# Patient Record
Sex: MALE | Race: BLACK OR AFRICAN AMERICAN | NOT HISPANIC OR LATINO | Employment: OTHER | URBAN - METROPOLITAN AREA
[De-identification: names, ages, dates, MRNs, and addresses within clinical notes are randomized per-mention and may not be internally consistent; named-entity substitution may affect disease eponyms.]

---

## 2023-04-27 ENCOUNTER — LAB (OUTPATIENT)
Dept: LAB | Facility: LAB | Age: 38
End: 2023-04-27
Payer: COMMERCIAL

## 2023-04-27 ENCOUNTER — OFFICE VISIT (OUTPATIENT)
Dept: PRIMARY CARE | Facility: CLINIC | Age: 38
End: 2023-04-27
Payer: COMMERCIAL

## 2023-04-27 VITALS
TEMPERATURE: 98.3 F | OXYGEN SATURATION: 98 % | BODY MASS INDEX: 30.09 KG/M2 | WEIGHT: 227 LBS | HEIGHT: 73 IN | DIASTOLIC BLOOD PRESSURE: 77 MMHG | SYSTOLIC BLOOD PRESSURE: 121 MMHG | HEART RATE: 68 BPM

## 2023-04-27 DIAGNOSIS — R20.0 NUMBNESS AND TINGLING: ICD-10-CM

## 2023-04-27 DIAGNOSIS — R20.2 NUMBNESS AND TINGLING: Primary | ICD-10-CM

## 2023-04-27 DIAGNOSIS — R20.0 NUMBNESS AND TINGLING: Primary | ICD-10-CM

## 2023-04-27 DIAGNOSIS — R20.2 NUMBNESS AND TINGLING: ICD-10-CM

## 2023-04-27 DIAGNOSIS — G44.219 EPISODIC TENSION-TYPE HEADACHE, NOT INTRACTABLE: Chronic | ICD-10-CM

## 2023-04-27 LAB
ALBUMIN (G/DL) IN SER/PLAS: 4.4 G/DL (ref 3.4–5)
ANION GAP IN SER/PLAS: 11 MMOL/L (ref 10–20)
CALCIDIOL (25 OH VITAMIN D3) (NG/ML) IN SER/PLAS: 11 NG/ML
CALCIUM (MG/DL) IN SER/PLAS: 9.3 MG/DL (ref 8.6–10.6)
CARBON DIOXIDE, TOTAL (MMOL/L) IN SER/PLAS: 28 MMOL/L (ref 21–32)
CHLORIDE (MMOL/L) IN SER/PLAS: 106 MMOL/L (ref 98–107)
COBALAMIN (VITAMIN B12) (PG/ML) IN SER/PLAS: 897 PG/ML (ref 211–911)
CREATININE (MG/DL) IN SER/PLAS: 0.64 MG/DL (ref 0.5–1.3)
GFR MALE: >90 ML/MIN/1.73M2
GLUCOSE (MG/DL) IN SER/PLAS: 89 MG/DL (ref 74–99)
MAGNESIUM (MG/DL) IN SER/PLAS: 2.4 MG/DL (ref 1.6–2.4)
PHOSPHATE (MG/DL) IN SER/PLAS: 4.6 MG/DL (ref 2.5–4.9)
POTASSIUM (MMOL/L) IN SER/PLAS: 4.4 MMOL/L (ref 3.5–5.3)
SODIUM (MMOL/L) IN SER/PLAS: 141 MMOL/L (ref 136–145)
UREA NITROGEN (MG/DL) IN SER/PLAS: 17 MG/DL (ref 6–23)

## 2023-04-27 PROCEDURE — 80069 RENAL FUNCTION PANEL: CPT

## 2023-04-27 PROCEDURE — 36415 COLL VENOUS BLD VENIPUNCTURE: CPT

## 2023-04-27 PROCEDURE — 82306 VITAMIN D 25 HYDROXY: CPT

## 2023-04-27 PROCEDURE — 99214 OFFICE O/P EST MOD 30 MIN: CPT | Performed by: STUDENT IN AN ORGANIZED HEALTH CARE EDUCATION/TRAINING PROGRAM

## 2023-04-27 PROCEDURE — 82607 VITAMIN B-12: CPT

## 2023-04-27 PROCEDURE — 1036F TOBACCO NON-USER: CPT | Performed by: STUDENT IN AN ORGANIZED HEALTH CARE EDUCATION/TRAINING PROGRAM

## 2023-04-27 PROCEDURE — 83735 ASSAY OF MAGNESIUM: CPT

## 2023-04-27 ASSESSMENT — PAIN SCALES - GENERAL: PAINLEVEL: 0-NO PAIN
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
## 2023-04-29 PROBLEM — R20.0 NUMBNESS AND TINGLING: Status: ACTIVE | Noted: 2023-04-29

## 2023-04-29 PROBLEM — R20.2 NUMBNESS AND TINGLING: Status: ACTIVE | Noted: 2023-04-29

## 2023-04-29 PROBLEM — G44.219 EPISODIC TENSION-TYPE HEADACHE, NOT INTRACTABLE: Chronic | Status: ACTIVE | Noted: 2023-04-29

## 2023-04-29 ASSESSMENT — ENCOUNTER SYMPTOMS
WEAKNESS: 0
DIARRHEA: 0
CHILLS: 0
NAUSEA: 0
DIZZINESS: 0
SORE THROAT: 0
FEVER: 0
SHORTNESS OF BREATH: 0
ARTHRALGIAS: 0
NUMBNESS: 1
ABDOMINAL PAIN: 0
TREMORS: 0
HEADACHES: 1
JOINT SWELLING: 0
COUGH: 0

## 2023-05-13 DIAGNOSIS — E55.9 VITAMIN D INSUFFICIENCY: Primary | ICD-10-CM

## 2023-05-13 RX ORDER — CHOLECALCIFEROL (VITAMIN D3) 25 MCG
25 TABLET ORAL DAILY
Qty: 30 TABLET | Refills: 11 | Status: SHIPPED | OUTPATIENT
Start: 2023-05-13 | End: 2023-05-23 | Stop reason: SDUPTHER

## 2023-05-15 ENCOUNTER — TELEPHONE (OUTPATIENT)
Dept: PRIMARY CARE | Facility: CLINIC | Age: 38
End: 2023-05-15
Payer: COMMERCIAL

## 2023-05-15 DIAGNOSIS — E55.9 VITAMIN D INSUFFICIENCY: ICD-10-CM

## 2023-05-23 RX ORDER — CHOLECALCIFEROL (VITAMIN D3) 25 MCG
25 TABLET ORAL DAILY
Qty: 30 TABLET | Refills: 11 | Status: SHIPPED | OUTPATIENT
Start: 2023-05-23 | End: 2024-05-22

## 2023-07-17 ENCOUNTER — LAB (OUTPATIENT)
Dept: LAB | Facility: LAB | Age: 38
End: 2023-07-17
Payer: COMMERCIAL

## 2023-07-17 ENCOUNTER — OFFICE VISIT (OUTPATIENT)
Dept: PRIMARY CARE | Facility: CLINIC | Age: 38
End: 2023-07-17
Payer: COMMERCIAL

## 2023-07-17 VITALS
HEART RATE: 60 BPM | SYSTOLIC BLOOD PRESSURE: 120 MMHG | TEMPERATURE: 97 F | WEIGHT: 231 LBS | OXYGEN SATURATION: 97 % | HEIGHT: 73 IN | BODY MASS INDEX: 30.62 KG/M2 | DIASTOLIC BLOOD PRESSURE: 75 MMHG

## 2023-07-17 DIAGNOSIS — Z20.2 STD EXPOSURE: ICD-10-CM

## 2023-07-17 DIAGNOSIS — Z20.2 STD EXPOSURE: Primary | ICD-10-CM

## 2023-07-17 LAB
HIV 1/ 2 AG/AB SCREEN: NONREACTIVE
SYPHILIS TOTAL AB: NONREACTIVE

## 2023-07-17 PROCEDURE — 87389 HIV-1 AG W/HIV-1&-2 AB AG IA: CPT

## 2023-07-17 PROCEDURE — 87661 TRICHOMONAS VAGINALIS AMPLIF: CPT

## 2023-07-17 PROCEDURE — 36415 COLL VENOUS BLD VENIPUNCTURE: CPT

## 2023-07-17 PROCEDURE — 87591 N.GONORRHOEAE DNA AMP PROB: CPT

## 2023-07-17 PROCEDURE — 87491 CHLMYD TRACH DNA AMP PROBE: CPT

## 2023-07-17 PROCEDURE — 86780 TREPONEMA PALLIDUM: CPT

## 2023-07-17 PROCEDURE — 1036F TOBACCO NON-USER: CPT

## 2023-07-17 PROCEDURE — 99213 OFFICE O/P EST LOW 20 MIN: CPT

## 2023-07-17 ASSESSMENT — ENCOUNTER SYMPTOMS
DYSURIA: 0
FREQUENCY: 0

## 2023-07-17 ASSESSMENT — PAIN SCALES - GENERAL: PAINLEVEL: 0-NO PAIN

## 2023-07-17 NOTE — PROGRESS NOTES
"Subjective   Patient ID:   Yogi Lynn III is a 38 y.o. male who presents for Exposure to STD.  Exposure to STD  The patient's pertinent negatives include no penile discharge or penile pain. Pertinent negatives include no dysuria or frequency.     #STD exposure  - Patient reported he is currently sexually active  - Reported he has had 2 female partners in the past 6 months  - He uses protection sometimes but not consistently  - Reported that his partner recently tested 2-3 weeks ago positive for chlamydia and trichomonas  - Reported he was recently tested at [place] but never found out his results  - He was given prophylactic Abx. He reported getting 4 pills of one Abx and 2 pills of another Abx  - Denies any associated symptoms     Review of Systems   Genitourinary:  Negative for dysuria, frequency, genital sores, penile discharge and penile pain.       Objective   /75 (BP Location: Right arm, Patient Position: Sitting, BP Cuff Size: Adult)   Pulse 60   Temp 36.1 °C (97 °F) (Temporal)   Ht 1.854 m (6' 1\")   Wt 105 kg (231 lb)   SpO2 97%   BMI 30.48 kg/m²    Physical Exam  Constitutional:       General: He is not in acute distress.     Appearance: Normal appearance.   Eyes:      Extraocular Movements: Extraocular movements intact.   Cardiovascular:      Rate and Rhythm: Normal rate and regular rhythm.      Heart sounds: Normal heart sounds. No murmur heard.  Pulmonary:      Effort: Pulmonary effort is normal. No respiratory distress.      Breath sounds: Normal breath sounds. No wheezing, rhonchi or rales.   Abdominal:      General: There is no distension.      Palpations: Abdomen is soft.      Tenderness: There is no abdominal tenderness.   Neurological:      General: No focal deficit present.      Mental Status: He is alert.   Psychiatric:         Mood and Affect: Mood normal.         Behavior: Behavior normal.         Assessment/Plan     Problem List Items Addressed This Visit    None  Yogi Lynn is " a 38 year old male who presents to the clinic for STI screening after exposure.    #STI screening  - Ordered HIV, Syphilis, Chlamydia, Gonorrhea, & Trichomonas  - Encouraged patient to abstain from [place] and to use protection if he does engage.       RTC in 1 year or earlier if needed.    The patient was discussed with Dr. Marroquin.    Werner Love MD  Family Medicine   PGY-2

## 2023-07-18 LAB
CHLAMYDIA TRACH., AMPLIFIED: NEGATIVE
N. GONORRHEA, AMPLIFIED: NEGATIVE
TRICHOMONAS VAGINALIS: NEGATIVE

## 2023-07-18 NOTE — PROGRESS NOTES
I reviewed the resident's documentation and discussed the patient with the resident. I agree with the resident's medical decision making as documented in the note.     Allie Marroquin MD

## 2023-07-20 ENCOUNTER — TELEPHONE (OUTPATIENT)
Dept: PRIMARY CARE | Facility: CLINIC | Age: 38
End: 2023-07-20
Payer: COMMERCIAL

## 2023-07-20 NOTE — TELEPHONE ENCOUNTER
Result Communication    Resulted Orders   Syphilis Screen with Reflex   Result Value Ref Range    Syphilis Total Ab NONREACTIVE NONREACTIVE      Comment:      No serologic evidence of syphilis infection.  If recent exposure is suspected, repeat syphilis testing  is recommended in 2 to 4 weeks.   HIV 1/2 Antigen/Antibody Screen with Reflex to Confirmation   Result Value Ref Range    HIV 1 and 2 Screen NONREACTIVE NONREACTIVE      Comment:       HIV Ag/Ab screen is performed using the [employer]   HIV Ag/Ab Combo assay which detects the presence of HIV    p24 antigen as well as antibodies to HIV-1   (Group M and O) and HIV-2.  .  No laboratory evidence of HIV infection. If acute HIV infection is   suspected, consider testing for HIV RNA by PCR (viral load).       12:49 PM      Results were successfully communicated with the patient and they acknowledged their understanding.

## 2023-07-31 ENCOUNTER — APPOINTMENT (OUTPATIENT)
Dept: LAB | Facility: LAB | Age: 38
End: 2023-07-31
Payer: COMMERCIAL

## 2023-07-31 LAB
ALANINE AMINOTRANSFERASE (SGPT) (U/L) IN SER/PLAS: 23 U/L (ref 10–52)
ALBUMIN (G/DL) IN SER/PLAS: 4.3 G/DL (ref 3.4–5)
ALKALINE PHOSPHATASE (U/L) IN SER/PLAS: 67 U/L (ref 33–120)
ANION GAP IN SER/PLAS: 10 MMOL/L (ref 10–20)
ASPARTATE AMINOTRANSFERASE (SGOT) (U/L) IN SER/PLAS: 19 U/L (ref 9–39)
BILIRUBIN TOTAL (MG/DL) IN SER/PLAS: 0.6 MG/DL (ref 0–1.2)
CALCIUM (MG/DL) IN SER/PLAS: 9.3 MG/DL (ref 8.6–10.6)
CARBON DIOXIDE, TOTAL (MMOL/L) IN SER/PLAS: 28 MMOL/L (ref 21–32)
CHLORIDE (MMOL/L) IN SER/PLAS: 108 MMOL/L (ref 98–107)
CREATININE (MG/DL) IN SER/PLAS: 0.8 MG/DL (ref 0.5–1.3)
ERYTHROCYTE DISTRIBUTION WIDTH (RATIO) BY AUTOMATED COUNT: 13.6 % (ref 11.5–14.5)
ERYTHROCYTE MEAN CORPUSCULAR HEMOGLOBIN CONCENTRATION (G/DL) BY AUTOMATED: 32.9 G/DL (ref 32–36)
ERYTHROCYTE MEAN CORPUSCULAR VOLUME (FL) BY AUTOMATED COUNT: 86 FL (ref 80–100)
ERYTHROCYTES (10*6/UL) IN BLOOD BY AUTOMATED COUNT: 5.29 X10E12/L (ref 4.5–5.9)
GFR MALE: >90 ML/MIN/1.73M2
GLUCOSE (MG/DL) IN SER/PLAS: 93 MG/DL (ref 74–99)
HEMATOCRIT (%) IN BLOOD BY AUTOMATED COUNT: 45.6 % (ref 41–52)
HEMOGLOBIN (G/DL) IN BLOOD: 15 G/DL (ref 13.5–17.5)
HEPATITIS B VIRUS CORE AB (PRESENCE) IN SER/PLAS BY IMM: NONREACTIVE
HEPATITIS B VIRUS SURFACE AB (MIU/ML) IN SERUM: 63.6 MIU/ML
HEPATITIS B VIRUS SURFACE AG PRESENCE IN SERUM: NONREACTIVE
HEPATITIS C VIRUS AB PRESENCE IN SERUM: NONREACTIVE
LEUKOCYTES (10*3/UL) IN BLOOD BY AUTOMATED COUNT: 4.3 X10E9/L (ref 4.4–11.3)
NRBC (PER 100 WBCS) BY AUTOMATED COUNT: 0 /100 WBC (ref 0–0)
PLATELETS (10*3/UL) IN BLOOD AUTOMATED COUNT: 264 X10E9/L (ref 150–450)
POTASSIUM (MMOL/L) IN SER/PLAS: 4.3 MMOL/L (ref 3.5–5.3)
PROTEIN TOTAL: 6.8 G/DL (ref 6.4–8.2)
SODIUM (MMOL/L) IN SER/PLAS: 142 MMOL/L (ref 136–145)
UREA NITROGEN (MG/DL) IN SER/PLAS: 14 MG/DL (ref 6–23)

## 2023-09-21 PROBLEM — L21.9 SEBORRHEIC DERMATITIS, UNSPECIFIED: Status: ACTIVE | Noted: 2023-01-05

## 2023-09-21 PROBLEM — B35.1 ONYCHOMYCOSIS OF TOENAIL: Status: ACTIVE | Noted: 2023-09-21

## 2023-09-21 PROBLEM — M21.6X1 PRONATION OF BOTH FEET: Status: ACTIVE | Noted: 2023-09-21

## 2023-09-21 PROBLEM — R06.02 SOB (SHORTNESS OF BREATH): Status: ACTIVE | Noted: 2017-01-11

## 2023-09-21 PROBLEM — N52.9 ERECTILE DYSFUNCTION: Status: ACTIVE | Noted: 2023-09-21

## 2023-09-21 PROBLEM — H40.1134 PRIMARY OPEN ANGLE GLAUCOMA OF BOTH EYES, INDETERMINATE STAGE: Status: ACTIVE | Noted: 2023-09-21

## 2023-09-21 PROBLEM — E66.9 OBESITY (BMI 30.0-34.9): Status: ACTIVE | Noted: 2023-09-21

## 2023-09-21 PROBLEM — E78.1 HYPERTRIGLYCERIDEMIA: Status: ACTIVE | Noted: 2023-09-21

## 2023-09-21 PROBLEM — H20.9 ANTERIOR UVEITIS: Status: ACTIVE | Noted: 2023-09-21

## 2023-09-21 PROBLEM — F12.90 MARIJUANA SMOKER: Status: ACTIVE | Noted: 2023-09-21

## 2023-09-21 PROBLEM — A59.9 TRICHOMONAS INFECTION: Status: ACTIVE | Noted: 2017-01-23

## 2023-09-21 PROBLEM — L73.2 HIDRADENITIS SUPPURATIVA: Status: ACTIVE | Noted: 2023-09-21

## 2023-09-21 PROBLEM — H53.8 BLURRY VISION, LEFT EYE: Status: ACTIVE | Noted: 2023-09-21

## 2023-09-21 PROBLEM — M79.673 FOOT PAIN: Status: ACTIVE | Noted: 2023-09-21

## 2023-09-21 PROBLEM — M21.6X2 PRONATION OF BOTH FEET: Status: ACTIVE | Noted: 2023-09-21

## 2023-09-21 PROBLEM — L01.00 IMPETIGO, UNSPECIFIED: Status: ACTIVE | Noted: 2023-01-05

## 2023-09-21 PROBLEM — E78.6 LOW HDL (UNDER 40): Status: ACTIVE | Noted: 2023-09-21

## 2023-09-21 PROBLEM — E66.811 OBESITY (BMI 30.0-34.9): Status: ACTIVE | Noted: 2023-09-21

## 2023-09-21 PROBLEM — H61.21 IMPACTED CERUMEN OF RIGHT EAR: Status: ACTIVE | Noted: 2023-09-21

## 2023-09-21 PROBLEM — L23.9 ALLERGIC CONTACT DERMATITIS, UNSPECIFIED CAUSE: Status: ACTIVE | Noted: 2023-01-05

## 2023-09-21 RX ORDER — CHLORHEXIDINE GLUCONATE 40 MG/ML
1 SOLUTION TOPICAL
COMMUNITY
Start: 2022-05-11 | End: 2023-10-23 | Stop reason: ALTCHOICE

## 2023-09-21 RX ORDER — AZITHROMYCIN 500 MG/1
TABLET, FILM COATED ORAL
COMMUNITY
End: 2023-10-23 | Stop reason: ALTCHOICE

## 2023-09-21 RX ORDER — SULFAMETHOXAZOLE AND TRIMETHOPRIM 400; 80 MG/1; MG/1
2 TABLET ORAL 2 TIMES DAILY
COMMUNITY

## 2023-09-21 RX ORDER — METRONIDAZOLE 500 MG/1
TABLET ORAL
COMMUNITY
End: 2023-10-23 | Stop reason: ALTCHOICE

## 2023-09-21 RX ORDER — CLINDAMYCIN PHOSPHATE 10 UG/ML
LOTION TOPICAL 2 TIMES DAILY
COMMUNITY

## 2023-09-21 RX ORDER — TADALAFIL 5 MG/1
5 TABLET ORAL DAILY
COMMUNITY

## 2023-09-21 RX ORDER — DORZOLAMIDE HYDROCHLORIDE AND TIMOLOL MALEATE 20; 5 MG/ML; MG/ML
1 SOLUTION/ DROPS OPHTHALMIC 2 TIMES DAILY
COMMUNITY
End: 2023-12-06 | Stop reason: SDUPTHER

## 2023-09-21 RX ORDER — CEPHALEXIN 500 MG/1
CAPSULE ORAL
COMMUNITY
End: 2023-10-23 | Stop reason: ALTCHOICE

## 2023-09-21 RX ORDER — LATANOPROSTENE BUNOD 0.24 MG/ML
1 SOLUTION/ DROPS OPHTHALMIC NIGHTLY
COMMUNITY
Start: 2023-07-13 | End: 2023-12-06 | Stop reason: SDUPTHER

## 2023-09-21 RX ORDER — TACROLIMUS 1 MG/G
1 OINTMENT TOPICAL
COMMUNITY
Start: 2022-09-21

## 2023-09-21 RX ORDER — KETOCONAZOLE 20 MG/ML
SHAMPOO, SUSPENSION TOPICAL
COMMUNITY

## 2023-09-21 RX ORDER — FOLIC ACID 1 MG/1
1 TABLET ORAL DAILY
COMMUNITY
End: 2023-10-23 | Stop reason: SDUPTHER

## 2023-09-21 RX ORDER — BRIMONIDINE TARTRATE 1 MG/ML
1 SOLUTION/ DROPS OPHTHALMIC 2 TIMES DAILY
COMMUNITY
End: 2023-12-06 | Stop reason: SDUPTHER

## 2023-09-21 RX ORDER — METHOTREXATE 2.5 MG/1
4 TABLET ORAL
COMMUNITY
End: 2023-10-23 | Stop reason: SDUPTHER

## 2023-09-21 RX ORDER — LATANOPROST 50 UG/ML
1 SOLUTION/ DROPS OPHTHALMIC NIGHTLY
COMMUNITY
End: 2024-03-27 | Stop reason: WASHOUT

## 2023-09-21 RX ORDER — MUPIROCIN 20 MG/G
1 OINTMENT TOPICAL
COMMUNITY
Start: 2022-09-15

## 2023-10-04 ENCOUNTER — PREP FOR PROCEDURE (OUTPATIENT)
Dept: OPHTHALMOLOGY | Facility: CLINIC | Age: 38
End: 2023-10-04
Payer: COMMERCIAL

## 2023-10-04 DIAGNOSIS — H40.42X3 UVEITIC GLAUCOMA, LEFT, SEVERE STAGE: Primary | ICD-10-CM

## 2023-10-04 DIAGNOSIS — H20.9 UVEITIC GLAUCOMA, LEFT, SEVERE STAGE: Primary | ICD-10-CM

## 2023-10-04 RX ORDER — CYCLOPENTOLATE HYDROCHLORIDE 10 MG/ML
1 SOLUTION/ DROPS OPHTHALMIC
Status: CANCELLED | OUTPATIENT
Start: 2023-10-04 | End: 2023-10-04

## 2023-10-04 RX ORDER — MOXIFLOXACIN 5 MG/ML
1 SOLUTION/ DROPS OPHTHALMIC
Status: CANCELLED | OUTPATIENT
Start: 2023-10-04 | End: 2023-10-04

## 2023-10-04 RX ORDER — TROPICAMIDE 10 MG/ML
1 SOLUTION/ DROPS OPHTHALMIC
Status: CANCELLED | OUTPATIENT
Start: 2023-10-04 | End: 2023-10-04

## 2023-10-04 RX ORDER — DICLOFENAC SODIUM 1 MG/ML
1 SOLUTION/ DROPS OPHTHALMIC
Status: CANCELLED | OUTPATIENT
Start: 2023-10-04 | End: 2023-10-04

## 2023-10-04 RX ORDER — PHENYLEPHRINE HYDROCHLORIDE 25 MG/ML
1 SOLUTION/ DROPS OPHTHALMIC
Status: CANCELLED | OUTPATIENT
Start: 2023-10-04 | End: 2023-10-04

## 2023-10-04 NOTE — H&P
History Of Present Illness  Yogi Lynn III is a 38 y.o. male presenting for ce/iol/gatt/tube needling, and STK.     Past Medical History  He has a past medical history of Body mass index (BMI)30.0-30.9, adult (06/13/2022), Hidradenitis suppurativa (05/20/2022), and Personal history of diseases of the skin and subcutaneous tissue (09/10/2021).    Surgical History  He has no past surgical history on file.     Social History  He reports that he has never smoked. He has never used smokeless tobacco. He reports current alcohol use. He reports current drug use. Drug: Marijuana.     Allergies  Patient has no known allergies.    ROS  Patient denies ocular pain, redness, discharge, decreased vision, double vision, blind spots, flashes, or floaters.     Physical Exam  Not recorded          Assessment/Plan   Diagnoses and all orders for this visit:  Uveitic glaucoma, left, severe stage  -     Case Request Operating Room: Extraction Cataract with Insertion Intraocular Lens, Goniotomy, Revision Shunt Eye, Injection Therapeutic Agent Eye Sub-Tenon Space; Standing  Other orders  -     Place in outpatient/hospital ambulatory surgery; Standing  -     Full code; Standing  -     Discharge instructions; Standing  -     Insert peripheral IV; Standing  -     Saline lock IV; Standing  -     Weigh patient; Standing  -     Measure height; Standing  -     Vital Signs; Standing  -     Pulse Oximetry; Standing  -     moxifloxacin (Vigamox) 0.5 % ophthalmic solution 1 drop  -     cyclopentolate (Cyclogyl) 1 % ophthalmic solution 1 drop  -     phenylephrine (Mydfrin) 2.5 % ophthalmic solution 1 drop  -     tropicamide (Mydriacyl) 1 % ophthalmic solution 1 drop  -     diclofenac (Voltaren) 0.1 % ophthalmic solution 1 drop      Proceed with surgery as planned             Janny Barton MD

## 2023-10-23 ENCOUNTER — OFFICE VISIT (OUTPATIENT)
Dept: RHEUMATOLOGY | Facility: CLINIC | Age: 38
End: 2023-10-23
Payer: COMMERCIAL

## 2023-10-23 ENCOUNTER — LAB (OUTPATIENT)
Dept: LAB | Facility: LAB | Age: 38
End: 2023-10-23
Payer: COMMERCIAL

## 2023-10-23 VITALS
SYSTOLIC BLOOD PRESSURE: 114 MMHG | BODY MASS INDEX: 29.95 KG/M2 | HEIGHT: 73 IN | HEART RATE: 65 BPM | TEMPERATURE: 98.4 F | WEIGHT: 226 LBS | DIASTOLIC BLOOD PRESSURE: 74 MMHG

## 2023-10-23 DIAGNOSIS — H20.9 UVEITIS: ICD-10-CM

## 2023-10-23 DIAGNOSIS — H20.9 UVEITIS: Primary | ICD-10-CM

## 2023-10-23 LAB
ALBUMIN SERPL BCP-MCNC: 4.4 G/DL (ref 3.4–5)
ALP SERPL-CCNC: 85 U/L (ref 33–120)
ALT SERPL W P-5'-P-CCNC: 18 U/L (ref 10–52)
ANION GAP SERPL CALC-SCNC: 11 MMOL/L (ref 10–20)
AST SERPL W P-5'-P-CCNC: 14 U/L (ref 9–39)
BILIRUB SERPL-MCNC: 0.9 MG/DL (ref 0–1.2)
BUN SERPL-MCNC: 16 MG/DL (ref 6–23)
CALCIUM SERPL-MCNC: 9.3 MG/DL (ref 8.6–10.6)
CHLORIDE SERPL-SCNC: 106 MMOL/L (ref 98–107)
CO2 SERPL-SCNC: 28 MMOL/L (ref 21–32)
CREAT SERPL-MCNC: 0.68 MG/DL (ref 0.5–1.3)
ERYTHROCYTE [DISTWIDTH] IN BLOOD BY AUTOMATED COUNT: 13.2 % (ref 11.5–14.5)
GFR SERPL CREATININE-BSD FRML MDRD: >90 ML/MIN/1.73M*2
GLUCOSE SERPL-MCNC: 99 MG/DL (ref 74–99)
HCT VFR BLD AUTO: 43.6 % (ref 41–52)
HGB BLD-MCNC: 14.6 G/DL (ref 13.5–17.5)
MCH RBC QN AUTO: 28.8 PG (ref 26–34)
MCHC RBC AUTO-ENTMCNC: 33.5 G/DL (ref 32–36)
MCV RBC AUTO: 86 FL (ref 80–100)
NRBC BLD-RTO: 0 /100 WBCS (ref 0–0)
PLATELET # BLD AUTO: 280 X10*3/UL (ref 150–450)
PMV BLD AUTO: 10.4 FL (ref 7.5–11.5)
POTASSIUM SERPL-SCNC: 4.1 MMOL/L (ref 3.5–5.3)
PROT SERPL-MCNC: 6.5 G/DL (ref 6.4–8.2)
RBC # BLD AUTO: 5.07 X10*6/UL (ref 4.5–5.9)
SODIUM SERPL-SCNC: 141 MMOL/L (ref 136–145)
WBC # BLD AUTO: 5.4 X10*3/UL (ref 4.4–11.3)

## 2023-10-23 PROCEDURE — 1036F TOBACCO NON-USER: CPT | Performed by: STUDENT IN AN ORGANIZED HEALTH CARE EDUCATION/TRAINING PROGRAM

## 2023-10-23 PROCEDURE — 36415 COLL VENOUS BLD VENIPUNCTURE: CPT

## 2023-10-23 PROCEDURE — 99213 OFFICE O/P EST LOW 20 MIN: CPT | Performed by: STUDENT IN AN ORGANIZED HEALTH CARE EDUCATION/TRAINING PROGRAM

## 2023-10-23 PROCEDURE — 80053 COMPREHEN METABOLIC PANEL: CPT

## 2023-10-23 PROCEDURE — 85027 COMPLETE CBC AUTOMATED: CPT

## 2023-10-23 RX ORDER — FOLIC ACID 1 MG/1
1 TABLET ORAL DAILY
Qty: 90 TABLET | Refills: 1 | Status: SHIPPED | OUTPATIENT
Start: 2023-10-23 | End: 2024-05-06

## 2023-10-23 RX ORDER — METHOTREXATE 2.5 MG/1
15 TABLET ORAL
Qty: 60 TABLET | Refills: 1 | Status: SHIPPED | OUTPATIENT
Start: 2023-10-23 | End: 2024-02-12 | Stop reason: SDUPTHER

## 2023-10-23 ASSESSMENT — PAIN SCALES - GENERAL: PAINLEVEL: 0-NO PAIN

## 2023-10-23 NOTE — PROGRESS NOTES
Subjective   Patient ID: [id number]   Yogi Lynn III is a 38 y.o. male who presents for Follow-up (Follow up visit ).  HPI  39 yo with hx of hydradenitis suppurativa, well controlled off treatment, who was refered to us by opthalmology for bilateral anterior uveitis and mixed glaucoma(open glaucoma& inflammatory) for steroid sparing immunosuppressive regimen, is here for follow up.       Patient developed vision loss L>R more than a year ago, and has been receiving ocular steroid drops. (at first was getting intraocular steroid injections).   Infectious workup was negative. RENETTA, HLA b27 negative. ACE neg, CXR WNL.    His uveitis has been quiescent, we started the patient on Methotrexate 6 tabs with folic acid, however he was only taking 4 tabs.    No ocular sxs. Reports inflammation in his eyes is gone. Reports pressure still elevated in the left eye.  No SOB, no rashes, no diarrhea, no infections.  Declines vaccination.  MS minutes. No swelling in joints. Intermittent pain in finger/toes.        Patient has no family or personal hx of rheumatic disease. Smokes marrijuana, occasional alcohol, no drug use.  Denies persistent arthralgias, joint swelling, headaches, nasal, oral ulcers, worsening SOB, CP, fever, chills, raynayd, dry eyes or mouth, constant back pain, skin rash other than the HS hx.  Has no complaints other than the blurry vision in the left eye, no pain.       Review of Systems    Objective   Physical Exam    AO*4  mild ocular erythema   no oral lesions  clear lungs  NlS1S2  no skin rashes ( axillary area not examined)  no active synovitis in joints     Assessment/Plan        39 yo with hx of hydradenitis suppurativa, well controlled off treatment, who was refereed to us by opthalmology for non infectious bilateral anterior uveitis and mixed glaucoma(open glaucoma& inflammatory) for steroid sparing immunosuppressive regimen, is here for follow up.     Patient developed vision loss L>R more than a  year ago, and has been receiving ocular steroid drops. (at first was getting intraocular steroid injections).   Infectious workup was negative. RENETTA, HLA b27 negative. ACE neg, CXR WNL.  Opthalmology suspecting Sarcoid vs HLA-B27. No extra ocular manifestations of Rheumatic diseases.   There is an association in the litterature between HS and uveitis, hence that is a possibility.   Given patient developed ocular glaucoma, continuing ocular steroids is not ideal and opthalmology are kindly asking for a steroid sparing regime. We started methotrexate 6 tabs weekly however patient has been taking 4 tabs. His most recent ophthalmology exam is without inflammation.   I reached out to ophthalmology to discuss the need to uptitrate methotrexate given that he's still on steroids drops and their preference is to increase the dose so that they can try to wean him off steroids meena. in the setting of glaucoma.     Plan:  - Increase methotrexate to  6 tabs once weekly with daily folic acid. Risks/benefits discussed with the patient  -patient to continue following with opthalmology for ocular changes   -repeat blood work in 3 months    RTC in 3-4 months    Tammi Fernandes MD  Rheumatology Fellow,PGY-V    Patient seen with Dr. Logan

## 2023-10-26 ENCOUNTER — OFFICE VISIT (OUTPATIENT)
Dept: OPHTHALMOLOGY | Facility: CLINIC | Age: 38
End: 2023-10-26
Payer: COMMERCIAL

## 2023-10-26 DIAGNOSIS — H25.13 AGE-RELATED NUCLEAR CATARACT OF BOTH EYES: Primary | ICD-10-CM

## 2023-10-26 PROCEDURE — 92136 OPHTHALMIC BIOMETRY: CPT | Performed by: OPHTHALMOLOGY

## 2023-10-26 PROCEDURE — 99214 OFFICE O/P EST MOD 30 MIN: CPT | Performed by: OPHTHALMOLOGY

## 2023-10-26 PROCEDURE — 92136 OPHTHALMIC BIOMETRY: CPT | Mod: BILATERAL PROCEDURE | Performed by: OPHTHALMOLOGY

## 2023-10-26 ASSESSMENT — VISUAL ACUITY
OD_SC: 20/60
OS_PH_SC: 20/50
OS_SC: 20/100
OS_SC+: +1
OD_SC+: -1
METHOD: SNELLEN - LINEAR
OD_PH_SC: 20/40
OD_PH_SC+: +1

## 2023-10-26 ASSESSMENT — TONOMETRY
OS_IOP_MMHG: 40
IOP_METHOD: GOLDMANN APPLANATION
OD_IOP_MMHG: 27

## 2023-10-26 ASSESSMENT — ENCOUNTER SYMPTOMS
PSYCHIATRIC NEGATIVE: 0
HEMATOLOGIC/LYMPHATIC NEGATIVE: 0
CONSTITUTIONAL NEGATIVE: 0
RESPIRATORY NEGATIVE: 0
MUSCULOSKELETAL NEGATIVE: 0
NEUROLOGICAL NEGATIVE: 0
EYES NEGATIVE: 0
ENDOCRINE NEGATIVE: 0
ALLERGIC/IMMUNOLOGIC NEGATIVE: 0
GASTROINTESTINAL NEGATIVE: 0
CARDIOVASCULAR NEGATIVE: 0

## 2023-10-26 ASSESSMENT — EXTERNAL EXAM - LEFT EYE: OS_EXAM: NORMAL

## 2023-10-26 ASSESSMENT — SLIT LAMP EXAM - LIDS
COMMENTS: NORMAL
COMMENTS: NORMAL

## 2023-10-26 ASSESSMENT — EXTERNAL EXAM - RIGHT EYE: OD_EXAM: NORMAL

## 2023-10-26 NOTE — PROGRESS NOTES
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 Sanon  s/p [facility] with STK ou  field 5/25/23: WNL OD, severe defect OS now with only central island  Iop has increased further, patient remains non-compliant with the drops  Likely risk of blindness again emphasized  continue cosopt BID OU, ,  continue vyzulta OU  continue alphagan bid OU  Start taper pf to qday x 2 weeks then stop (going up on methotrexate per rheum will try to taper off the steroids)  RECOMMEND PROCEEDING WITH CE/IOL/GATT/TUBE NEEDLING OS, OD will likely need to follow  See Dr. Sanon in the meantime. IF iop improves markedly and inflammation stays under control after stopping PF will consider defer surgery    anterior uveitis, both eyes:  patient does report some arthralgias  asymmptomatic from ocular standpoint  continue f/u with Dr. Sanon,importance of f/u stressed!    cataract, left eye  will help with adressing above  will put patient on PO pred 3 days before surgery  will discuss with Dr. 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

## 2023-11-28 ENCOUNTER — ANESTHESIA EVENT (OUTPATIENT)
Dept: OPERATING ROOM | Facility: CLINIC | Age: 38
End: 2023-11-28
Payer: COMMERCIAL

## 2023-12-04 ENCOUNTER — APPOINTMENT (OUTPATIENT)
Dept: OPHTHALMOLOGY | Facility: CLINIC | Age: 38
End: 2023-12-04
Payer: COMMERCIAL

## 2023-12-04 ENCOUNTER — PHARMACY VISIT (OUTPATIENT)
Dept: PHARMACY | Facility: CLINIC | Age: 38
End: 2023-12-04
Payer: MEDICAID

## 2023-12-04 DIAGNOSIS — Z98.42 CATARACT EXTRACTION STATUS OF LEFT EYE: Primary | ICD-10-CM

## 2023-12-04 PROCEDURE — RXMED WILLOW AMBULATORY MEDICATION CHARGE

## 2023-12-04 RX ORDER — PREDNISOLONE ACETATE 10 MG/ML
1 SUSPENSION/ DROPS OPHTHALMIC 4 TIMES DAILY
Qty: 5 ML | Refills: 0 | Status: SHIPPED | OUTPATIENT
Start: 2023-12-04

## 2023-12-04 RX ORDER — MOXIFLOXACIN 5 MG/ML
1 SOLUTION/ DROPS OPHTHALMIC 3 TIMES DAILY
Qty: 3 ML | Refills: 0 | Status: SHIPPED | OUTPATIENT
Start: 2023-12-04

## 2023-12-04 RX ORDER — PREDNISOLONE ACETATE 10 MG/ML
1 SUSPENSION/ DROPS OPHTHALMIC 4 TIMES DAILY
COMMUNITY
End: 2023-12-04 | Stop reason: SDUPTHER

## 2023-12-04 RX ORDER — MOXIFLOXACIN 5 MG/ML
1 SOLUTION/ DROPS OPHTHALMIC 3 TIMES DAILY
COMMUNITY
End: 2023-12-04 | Stop reason: SDUPTHER

## 2023-12-05 ENCOUNTER — ANESTHESIA (OUTPATIENT)
Dept: OPERATING ROOM | Facility: CLINIC | Age: 38
End: 2023-12-05
Payer: COMMERCIAL

## 2023-12-05 ENCOUNTER — HOSPITAL ENCOUNTER (OUTPATIENT)
Facility: CLINIC | Age: 38
Setting detail: OUTPATIENT SURGERY
Discharge: HOME | End: 2023-12-05
Attending: OPHTHALMOLOGY | Admitting: OPHTHALMOLOGY
Payer: COMMERCIAL

## 2023-12-05 VITALS
HEIGHT: 73 IN | BODY MASS INDEX: 30.45 KG/M2 | OXYGEN SATURATION: 99 % | SYSTOLIC BLOOD PRESSURE: 125 MMHG | DIASTOLIC BLOOD PRESSURE: 72 MMHG | TEMPERATURE: 97.3 F | RESPIRATION RATE: 16 BRPM | WEIGHT: 229.72 LBS | HEART RATE: 68 BPM

## 2023-12-05 DIAGNOSIS — H40.42X3 UVEITIC GLAUCOMA, LEFT, SEVERE STAGE: Primary | ICD-10-CM

## 2023-12-05 DIAGNOSIS — H20.9 UVEITIC GLAUCOMA, LEFT, SEVERE STAGE: Primary | ICD-10-CM

## 2023-12-05 PROBLEM — F41.9 ANXIETY: Status: ACTIVE | Noted: 2023-12-05

## 2023-12-05 PROCEDURE — 2500000004 HC RX 250 GENERAL PHARMACY W/ HCPCS (ALT 636 FOR OP/ED): Mod: SE

## 2023-12-05 PROCEDURE — 2500000001 HC RX 250 WO HCPCS SELF ADMINISTERED DRUGS (ALT 637 FOR MEDICARE OP): Mod: SE | Performed by: OPHTHALMOLOGY

## 2023-12-05 PROCEDURE — 7100000010 HC PHASE TWO TIME - EACH INCREMENTAL 1 MINUTE: Performed by: OPHTHALMOLOGY

## 2023-12-05 PROCEDURE — A65820 PR RELIEVE INNER EYE PRESSURE

## 2023-12-05 PROCEDURE — 3600000003 HC OR TIME - INITIAL BASE CHARGE - PROCEDURE LEVEL THREE: Performed by: OPHTHALMOLOGY

## 2023-12-05 PROCEDURE — 3700000002 HC GENERAL ANESTHESIA TIME - EACH INCREMENTAL 1 MINUTE: Performed by: OPHTHALMOLOGY

## 2023-12-05 PROCEDURE — 65820 GONIOTOMY: CPT | Performed by: OPHTHALMOLOGY

## 2023-12-05 PROCEDURE — 66184 REVISION OF AQUEOUS SHUNT: CPT | Performed by: OPHTHALMOLOGY

## 2023-12-05 PROCEDURE — 7100000002 HC RECOVERY ROOM TIME - EACH INCREMENTAL 1 MINUTE: Performed by: OPHTHALMOLOGY

## 2023-12-05 PROCEDURE — 7100000001 HC RECOVERY ROOM TIME - INITIAL BASE CHARGE: Performed by: OPHTHALMOLOGY

## 2023-12-05 PROCEDURE — 7100000009 HC PHASE TWO TIME - INITIAL BASE CHARGE: Performed by: OPHTHALMOLOGY

## 2023-12-05 PROCEDURE — 2500000005 HC RX 250 GENERAL PHARMACY W/O HCPCS: Mod: SE

## 2023-12-05 PROCEDURE — 66984 XCAPSL CTRC RMVL W/O ECP: CPT | Performed by: OPHTHALMOLOGY

## 2023-12-05 PROCEDURE — C1780 LENS, INTRAOCULAR (NEW TECH): HCPCS | Performed by: OPHTHALMOLOGY

## 2023-12-05 PROCEDURE — 3700000001 HC GENERAL ANESTHESIA TIME - INITIAL BASE CHARGE: Performed by: OPHTHALMOLOGY

## 2023-12-05 PROCEDURE — 2720000007 HC OR 272 NO HCPCS: Performed by: OPHTHALMOLOGY

## 2023-12-05 PROCEDURE — A65820 PR RELIEVE INNER EYE PRESSURE: Performed by: ANESTHESIOLOGY

## 2023-12-05 PROCEDURE — 3600000008 HC OR TIME - EACH INCREMENTAL 1 MINUTE - PROCEDURE LEVEL THREE: Performed by: OPHTHALMOLOGY

## 2023-12-05 PROCEDURE — 2760000001 HC OR 276 NO HCPCS: Performed by: OPHTHALMOLOGY

## 2023-12-05 DEVICE — ACRYSOF(R) IQ ASPHERIC NATURAL IOL, SINGLE-PIECE ACRYLIC FOLDABLE PCL, UV WITH BLUE LIGHTFILTER, 13.0MM LENGTH, 6.0MM ANTERIORASYMMETRIC BICONVEX OPTIC, PLANAR HAPTICS.
Type: IMPLANTABLE DEVICE | Site: EYE | Status: FUNCTIONAL
Brand: ACRYSOF®

## 2023-12-05 DEVICE — CANALOPLASTY MICROCATHETER KITKIT CONTENTS:(1) CANALOPLASTY MICROCATHETER ITRACK 250A(1) OPTHALMIC VISCOINJECTOR
Type: IMPLANTABLE DEVICE | Site: EYE | Status: FUNCTIONAL
Brand: ITRACK 250A

## 2023-12-05 RX ORDER — PREDNISOLONE ACETATE 10 MG/ML
SUSPENSION/ DROPS OPHTHALMIC AS NEEDED
Status: DISCONTINUED | OUTPATIENT
Start: 2023-12-05 | End: 2023-12-05 | Stop reason: HOSPADM

## 2023-12-05 RX ORDER — OXYCODONE AND ACETAMINOPHEN 5; 325 MG/1; MG/1
1 TABLET ORAL EVERY 4 HOURS PRN
Status: DISCONTINUED | OUTPATIENT
Start: 2023-12-05 | End: 2023-12-05 | Stop reason: HOSPADM

## 2023-12-05 RX ORDER — SODIUM CHLORIDE, SODIUM LACTATE, POTASSIUM CHLORIDE, CALCIUM CHLORIDE 600; 310; 30; 20 MG/100ML; MG/100ML; MG/100ML; MG/100ML
100 INJECTION, SOLUTION INTRAVENOUS CONTINUOUS
Status: DISCONTINUED | OUTPATIENT
Start: 2023-12-05 | End: 2023-12-05 | Stop reason: HOSPADM

## 2023-12-05 RX ORDER — TROPICAMIDE 10 MG/ML
1 SOLUTION/ DROPS OPHTHALMIC
Status: COMPLETED | OUTPATIENT
Start: 2023-12-05 | End: 2023-12-05

## 2023-12-05 RX ORDER — MIDAZOLAM HYDROCHLORIDE 1 MG/ML
1 INJECTION INTRAMUSCULAR; INTRAVENOUS ONCE AS NEEDED
Status: DISCONTINUED | OUTPATIENT
Start: 2023-12-05 | End: 2023-12-05 | Stop reason: HOSPADM

## 2023-12-05 RX ORDER — MOXIFLOXACIN 5 MG/ML
1 SOLUTION/ DROPS OPHTHALMIC
Status: COMPLETED | OUTPATIENT
Start: 2023-12-05 | End: 2023-12-05

## 2023-12-05 RX ORDER — PROPOFOL 10 MG/ML
INJECTION, EMULSION INTRAVENOUS AS NEEDED
Status: DISCONTINUED | OUTPATIENT
Start: 2023-12-05 | End: 2023-12-05

## 2023-12-05 RX ORDER — CYCLOPENTOLATE HYDROCHLORIDE 10 MG/ML
1 SOLUTION/ DROPS OPHTHALMIC
Status: COMPLETED | OUTPATIENT
Start: 2023-12-05 | End: 2023-12-05

## 2023-12-05 RX ORDER — DICLOFENAC SODIUM 1 MG/ML
1 SOLUTION/ DROPS OPHTHALMIC
Status: COMPLETED | OUTPATIENT
Start: 2023-12-05 | End: 2023-12-05

## 2023-12-05 RX ORDER — DICLOFENAC SODIUM 1 MG/ML
SOLUTION/ DROPS OPHTHALMIC AS NEEDED
Status: DISCONTINUED | OUTPATIENT
Start: 2023-12-05 | End: 2023-12-05 | Stop reason: HOSPADM

## 2023-12-05 RX ORDER — PHENYLEPHRINE HYDROCHLORIDE 25 MG/ML
1 SOLUTION/ DROPS OPHTHALMIC
Status: COMPLETED | OUTPATIENT
Start: 2023-12-05 | End: 2023-12-05

## 2023-12-05 RX ORDER — POVIDONE-IODINE 5 %
SOLUTION, NON-ORAL OPHTHALMIC (EYE) AS NEEDED
Status: DISCONTINUED | OUTPATIENT
Start: 2023-12-05 | End: 2023-12-05 | Stop reason: HOSPADM

## 2023-12-05 RX ORDER — LIDOCAINE HYDROCHLORIDE 20 MG/ML
INJECTION, SOLUTION INFILTRATION; PERINEURAL AS NEEDED
Status: DISCONTINUED | OUTPATIENT
Start: 2023-12-05 | End: 2023-12-05 | Stop reason: HOSPADM

## 2023-12-05 RX ORDER — LIDOCAINE HYDROCHLORIDE 10 MG/ML
0.1 INJECTION INFILTRATION; PERINEURAL ONCE
Status: DISCONTINUED | OUTPATIENT
Start: 2023-12-05 | End: 2023-12-05 | Stop reason: HOSPADM

## 2023-12-05 RX ORDER — METOCLOPRAMIDE HYDROCHLORIDE 5 MG/ML
10 INJECTION INTRAMUSCULAR; INTRAVENOUS ONCE AS NEEDED
Status: DISCONTINUED | OUTPATIENT
Start: 2023-12-05 | End: 2023-12-05 | Stop reason: HOSPADM

## 2023-12-05 RX ORDER — LIDOCAINE HYDROCHLORIDE 10 MG/ML
INJECTION INFILTRATION; PERINEURAL AS NEEDED
Status: DISCONTINUED | OUTPATIENT
Start: 2023-12-05 | End: 2023-12-05

## 2023-12-05 RX ORDER — FENTANYL CITRATE 50 UG/ML
INJECTION, SOLUTION INTRAMUSCULAR; INTRAVENOUS AS NEEDED
Status: DISCONTINUED | OUTPATIENT
Start: 2023-12-05 | End: 2023-12-05

## 2023-12-05 RX ORDER — ONDANSETRON HYDROCHLORIDE 2 MG/ML
INJECTION, SOLUTION INTRAVENOUS AS NEEDED
Status: DISCONTINUED | OUTPATIENT
Start: 2023-12-05 | End: 2023-12-05

## 2023-12-05 RX ORDER — DEXAMETHASONE SODIUM PHOSPHATE 100 MG/10ML
INJECTION INTRAMUSCULAR; INTRAVENOUS AS NEEDED
Status: DISCONTINUED | OUTPATIENT
Start: 2023-12-05 | End: 2023-12-05

## 2023-12-05 RX ORDER — MOXIFLOXACIN 5 MG/ML
SOLUTION/ DROPS OPHTHALMIC AS NEEDED
Status: DISCONTINUED | OUTPATIENT
Start: 2023-12-05 | End: 2023-12-05 | Stop reason: HOSPADM

## 2023-12-05 RX ADMIN — DICLOFENAC SODIUM 1 DROP: 1 SOLUTION OPHTHALMIC at 11:00

## 2023-12-05 RX ADMIN — CYCLOPENTOLATE HYDROCHLORIDE 1 DROP: 10 SOLUTION/ DROPS OPHTHALMIC at 11:10

## 2023-12-05 RX ADMIN — PROPOFOL 30 MG: 10 INJECTION, EMULSION INTRAVENOUS at 12:42

## 2023-12-05 RX ADMIN — TROPICAMIDE 1 DROP: 10 SOLUTION/ DROPS OPHTHALMIC at 11:00

## 2023-12-05 RX ADMIN — DICLOFENAC SODIUM 1 DROP: 1 SOLUTION OPHTHALMIC at 11:05

## 2023-12-05 RX ADMIN — TROPICAMIDE 1 DROP: 10 SOLUTION/ DROPS OPHTHALMIC at 11:05

## 2023-12-05 RX ADMIN — PHENYLEPHRINE HYDROCHLORIDE 1 DROP: 25 SOLUTION/ DROPS OPHTHALMIC at 11:10

## 2023-12-05 RX ADMIN — TROPICAMIDE 1 DROP: 10 SOLUTION/ DROPS OPHTHALMIC at 11:10

## 2023-12-05 RX ADMIN — PROPOFOL 200 MG: 10 INJECTION, EMULSION INTRAVENOUS at 12:02

## 2023-12-05 RX ADMIN — DEXAMETHASONE SODIUM PHOSPHATE 4 MG: 10 INJECTION INTRAMUSCULAR; INTRAVENOUS at 12:31

## 2023-12-05 RX ADMIN — SODIUM CHLORIDE, SODIUM LACTATE, POTASSIUM CHLORIDE, AND CALCIUM CHLORIDE: .6; .31; .03; .02 INJECTION, SOLUTION INTRAVENOUS at 11:58

## 2023-12-05 RX ADMIN — CYCLOPENTOLATE HYDROCHLORIDE 1 DROP: 10 SOLUTION/ DROPS OPHTHALMIC at 11:05

## 2023-12-05 RX ADMIN — CYCLOPENTOLATE HYDROCHLORIDE 1 DROP: 10 SOLUTION/ DROPS OPHTHALMIC at 11:00

## 2023-12-05 RX ADMIN — MOXIFLOXACIN 1 DROP: 5 SOLUTION/ DROPS OPHTHALMIC at 11:05

## 2023-12-05 RX ADMIN — LIDOCAINE HYDROCHLORIDE 50 MG: 10 INJECTION, SOLUTION INFILTRATION; PERINEURAL at 12:02

## 2023-12-05 RX ADMIN — DICLOFENAC SODIUM 1 DROP: 1 SOLUTION OPHTHALMIC at 11:10

## 2023-12-05 RX ADMIN — PROPOFOL 50 MG: 10 INJECTION, EMULSION INTRAVENOUS at 12:07

## 2023-12-05 RX ADMIN — MOXIFLOXACIN 1 DROP: 5 SOLUTION/ DROPS OPHTHALMIC at 11:10

## 2023-12-05 RX ADMIN — PHENYLEPHRINE HYDROCHLORIDE 1 DROP: 25 SOLUTION/ DROPS OPHTHALMIC at 11:00

## 2023-12-05 RX ADMIN — PHENYLEPHRINE HYDROCHLORIDE 1 DROP: 25 SOLUTION/ DROPS OPHTHALMIC at 11:05

## 2023-12-05 RX ADMIN — FENTANYL CITRATE 50 MCG: 50 INJECTION, SOLUTION INTRAMUSCULAR; INTRAVENOUS at 12:12

## 2023-12-05 RX ADMIN — ONDANSETRON 4 MG: 2 INJECTION INTRAMUSCULAR; INTRAVENOUS at 12:31

## 2023-12-05 RX ADMIN — MOXIFLOXACIN 1 DROP: 5 SOLUTION/ DROPS OPHTHALMIC at 11:00

## 2023-12-05 ASSESSMENT — COLUMBIA-SUICIDE SEVERITY RATING SCALE - C-SSRS
1. IN THE PAST MONTH, HAVE YOU WISHED YOU WERE DEAD OR WISHED YOU COULD GO TO SLEEP AND NOT WAKE UP?: NO
6. HAVE YOU EVER DONE ANYTHING, STARTED TO DO ANYTHING, OR PREPARED TO DO ANYTHING TO END YOUR LIFE?: NO
2. HAVE YOU ACTUALLY HAD ANY THOUGHTS OF KILLING YOURSELF?: NO

## 2023-12-05 ASSESSMENT — PAIN SCALES - GENERAL
PAIN_LEVEL: 0
PAINLEVEL_OUTOF10: 0 - NO PAIN

## 2023-12-05 ASSESSMENT — PAIN - FUNCTIONAL ASSESSMENT: PAIN_FUNCTIONAL_ASSESSMENT: 0-10

## 2023-12-05 NOTE — OP NOTE
Cataract Phacoemulsification w/I0L (L) Operative Note     Date: 2023  OR Location: [facility] OR    Name: Yogi Lynn III, : 1985, Age: 38 y.o., MRN: [id number], Sex: male    Diagnosis  Pre-op Diagnosis     * Uveitic glaucoma, left, severe stage [H40.42X3, H20.9] Post-op Diagnosis     * Uveitic glaucoma, left, severe stage [H40.42X3, H20.9]     Procedures  Extraction Cataract with Insertion Intraocular Lens  09339 - CT XCAPSL CTRC RMVL INSJ IO LENS PROSTH CPLX WO ECP    Goniotomy  77271 - CT GONIOTOMY  , LEFT    Tube revision, left eye        Surgeons   Panel 1:     * Janny Barton - Primary  Panel 2:     * Janny Barton - Primary    Resident/Fellow/Other Assistant:  Rachael    Procedure Summary  Anesthesia: General  ASA: II  Anesthesia Staff:   Anesthesiologist: Carl Ureña MD  C-AA: TUYET Olguin  Estimated Blood Loss: none        Specimen: No specimens collected     Staff:   Circulator: Sera Larose RN; Yolanda Love RN  Scrub Person: Britt Jenkins RN          Findings: as expected    Indications: Yogi Lynn III is an 38 y.o. male who is having surgery for Uveitic glaucoma, left, severe stage [H40.42X3, H20.9]. LEFT    The patient was seen in the preoperative area. The risks, benefits, complications, treatment options, non-operative alternatives, expected recovery and outcomes were discussed with the patient. The possibilities of reaction to medication, pulmonary aspiration, injury to surrounding structures, bleeding, recurrent infection, the need for additional procedures, failure to diagnose a condition, and creating a complication or operation were discussed with the patient. The patient concurred with the proposed plan, giving informed consent.  The site of surgery was properly noted/marked if necessary per policy.     Procedure Details:   The patient was escorted to the operating room where a time out was completed   and general anesthesia was induced.  The patient was  prepped and draped in   the usual sterile fashion for intraocular surgery. A lid speculum was placed   and the operating microscope was positioned. A paracentesis was made to the   left of the planned cataract incision with a 1mm blade.  Shugarcaine followed by Viscoat was used to   replace the aqueous humor. A temporal clear corneal wound was made with a 2.4   mm keratome, extending 2 mm into clear cornea before entering the anterior   chamber. A continuous curvilinear  capsulorhexis of approximately 5 mm in   diameter was performed. Hydrodissection was performed using a canula. The   pre-chopper was used to crack the nucleus into smaller pieces which were then   removed with the assistance of a horizontal chopper and phaco hand piece.   Residual cortex was removed with IA. ProVisc was used to inflate the capsular   bag. The lens implant  simón SN60WF 20.0 diopter intraocular lens. The lens was   inserted into the capsular bag and rotated to the proper position with a   adrianne. The patient was then positioned for direct gonioscopy. A hour 3 clock hour nasal goniotomy was made.   The microcatheter was primed and then thread for 270 degrees and then retracted whilst   injecting viscoelastic approximately 8-10 clock hours per quadrant. the device   was then rethreaded and used to make a 90 degree goniotomy. The device was then removed an dthe patietn was repositioned supine. A 7-0 vicryl traction suture was placed nasally and the eye was infraducted. A 27 janneth needle was used to needle the temporal and suprior aspects of the plate. An anterior chamber canula was then used to flush the tube and a bleb was raised. The traction suture was removed. Residual   viscoelastic was removed from the eye with the irrigation/aspiration   instrument. The wounds were checked and found to be watertight. The anterior   chamber was at physiologic depth and pressure. Viscoat was left in the anteriro chamber to prevent post oeprative  hypotony. The lid speculum was removed and   a patch and shield were taped in place. The patient tolerated the procedure   well, and there were no complications. Complications:  None   Disposition: stable          Attending Attestation: I was present and scrubbed for the entire procedure    Janny Barton  Phone Number: [phone]

## 2023-12-05 NOTE — H&P
"History Of Present Illness  Yogi Lynn III is a 38 y.o. 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     Past Medical History  Past Medical History:   Diagnosis Date    Body mass index (BMI)30.0-30.9, adult 06/13/2022    BMI 30.0-30.9,adult    Hidradenitis suppurativa 05/20/2022    Hidradenitis suppurativa    Personal history of diseases of the skin and subcutaneous tissue 09/10/2021    History of hidradenitis suppurativa       Surgical History  Past Surgical History:   Procedure Laterality Date    IRIDOTOMY / IRIDECTOMY          Social History  He reports that he has never smoked. He has never used smokeless tobacco. He reports current alcohol use. He reports current drug use. Drug: Marijuana.    Family History  Family History   Problem Relation Name Age of Onset    Diabetes Mother          Allergies  Patient has no known allergies.    Review of Systems   All other systems reviewed and are negative.       Physical Exam  Vitals reviewed.   Constitutional:       Appearance: Normal appearance.   HENT:      Head: Normocephalic and atraumatic.      Mouth/Throat:      Pharynx: Oropharynx is clear.   Eyes:      Conjunctiva/sclera: Conjunctivae normal.   Abdominal:      General: Abdomen is flat.   Musculoskeletal:         General: Normal range of motion.      Cervical back: Normal range of motion.   Skin:     General: Skin is warm.   Neurological:      General: No focal deficit present.      Mental Status: He is alert and oriented to person, place, and time.   Psychiatric:         Mood and Affect: Mood normal.         Behavior: Behavior normal.          Last Recorded Vitals  Blood pressure 126/85, pulse 56, temperature 37 °C (98.6 °F), temperature source Temporal, resp. rate 18, height 1.854 m (6' 1\"), weight 104 kg (229 lb 11.5 oz), SpO2 100 %.    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

## 2023-12-05 NOTE — ANESTHESIA PROCEDURE NOTES
Airway  Date/Time: 12/5/2023 12:05 PM  Urgency: elective    Airway not difficult    Staffing  Performed: TUYET   Authorized by: Carl Ureña MD    Performed by: TUYET Olguin  Patient location during procedure: OR    Indications and Patient Condition  Indications for airway management: anesthesia  Spontaneous Ventilation: absent  Sedation level: deep  Preoxygenated: yes  Patient position: sniffing  Mask difficulty assessment: 1 - vent by mask  Planned trial extubation    Final Airway Details  Final airway type: supraglottic airway      Successful airway: unique  Size 4  Cuff Pressure (cm H2O): 20     Number of attempts at approach: 1

## 2023-12-05 NOTE — DISCHARGE INSTRUCTIONS
Dr. Barton's discharge instructions sheet reviewed with patient, he verbalized understanding, questions answered.

## 2023-12-05 NOTE — ANESTHESIA PREPROCEDURE EVALUATION
Patient: Yogi Lynn III    Procedure Information       Date/Time: 12/05/23 1130    Procedures:       Extraction Cataract with Insertion Intraocular Lens (Left)      Goniotomy (Left)      Revision Shunt Eye (Left)      Injection Therapeutic Agent Eye Sub-Tenon Space (Left)    [facility]    Surgeons: Janny Barton MD            Relevant Problems   Anesthesia (within normal limits)      Cardiovascular   (+) Hypertriglyceridemia      Endocrine (within normal limits)      GI (within normal limits)      /Renal (within normal limits)      Neuro/Psych   (+) Anxiety   (+) Episodic tension-type headache, not intractable      Pulmonary (within normal limits)      GI/Hepatic (within normal limits)      Hematology (within normal limits)      Musculoskeletal (within normal limits)      Eyes, Ears, Nose, and Throat   (+) Primary open angle glaucoma of both eyes, indeterminate stage   (+) Uveitic glaucoma, left, severe stage      Infectious Disease   (+) Hidradenitis suppurativa   (+) Impetigo, unspecified   (+) Onychomycosis of toenail   (+) Trichomonas infection       Clinical information reviewed:   Tobacco  Allergies  Meds   Med Hx  Surg Hx   Fam Hx  Soc Hx        NPO Detail:  NPO/Void Status  Date of Last Liquid: 12/04/23  Time of Last Liquid: 2100  Date of Last Solid: 12/04/23  Time of Last Solid: 2100  Last Intake Type: Light meal         Physical Exam    Airway  Mallampati: II  TM distance: >3 FB  Neck ROM: full     Cardiovascular - normal exam     Dental - normal exam     Pulmonary - normal exam     Abdominal - normal exam             Anesthesia Plan    ASA 2     general     intravenous induction   Anesthetic plan and risks discussed with patient.    Plan discussed with CAA.

## 2023-12-05 NOTE — ANESTHESIA POSTPROCEDURE EVALUATION
Patient: Yogi Lynn III    Procedure Summary       Date: 12/05/23 [facility]    Anesthesia Start: 1200 Anesthesia Stop: 1256    Procedures:       Extraction Cataract with Insertion Intraocular Lens (Left)      Goniotomy (Left) Diagnosis:       Uveitic glaucoma, left, severe stage      (Uveitic glaucoma, left, severe stage [H40.42X3, H20.9])    Surgeons: Janny Barton MD Responsible Provider: Carl Ureña MD    Anesthesia Type: general ASA Status: 2            Anesthesia Type: general    Vitals Value Taken Time   /64 12/05/23 1254   Temp 36.3 °C (97.3 °F) 12/05/23 1254   Pulse 67 12/05/23 1254   Resp 16 12/05/23 1254   SpO2 97 % 12/05/23 1254       Anesthesia Post Evaluation    Patient location during evaluation: PACU  Patient participation: complete - patient participated  Level of consciousness: awake  Pain score: 0  Pain management: adequate  Airway patency: patent  Cardiovascular status: acceptable  Respiratory status: acceptable  Hydration status: acceptable  Postoperative Nausea and Vomiting: none  Comments: No PONV        There were no known notable events for this encounter.

## 2023-12-06 ENCOUNTER — OFFICE VISIT (OUTPATIENT)
Dept: OPHTHALMOLOGY | Facility: CLINIC | Age: 38
End: 2023-12-06
Payer: COMMERCIAL

## 2023-12-06 DIAGNOSIS — H40.1134 PRIMARY OPEN ANGLE GLAUCOMA OF BOTH EYES, INDETERMINATE STAGE: Primary | ICD-10-CM

## 2023-12-06 PROCEDURE — 99024 POSTOP FOLLOW-UP VISIT: CPT | Performed by: OPHTHALMOLOGY

## 2023-12-06 RX ORDER — DORZOLAMIDE HYDROCHLORIDE AND TIMOLOL MALEATE 20; 5 MG/ML; MG/ML
1 SOLUTION/ DROPS OPHTHALMIC 2 TIMES DAILY
Qty: 30 ML | Refills: 3 | Status: SHIPPED | OUTPATIENT
Start: 2023-12-06 | End: 2024-06-06 | Stop reason: SDUPTHER

## 2023-12-06 RX ORDER — LATANOPROSTENE BUNOD 0.24 MG/ML
1 SOLUTION/ DROPS OPHTHALMIC NIGHTLY
Qty: 7.5 ML | Refills: 3 | Status: SHIPPED | OUTPATIENT
Start: 2023-12-06 | End: 2024-03-27 | Stop reason: SDUPTHER

## 2023-12-06 RX ORDER — BRIMONIDINE TARTRATE 1.5 MG/ML
1 SOLUTION/ DROPS OPHTHALMIC 2 TIMES DAILY
Qty: 30 ML | Refills: 3 | Status: SHIPPED | OUTPATIENT
Start: 2023-12-06 | End: 2024-06-06 | Stop reason: SDUPTHER

## 2023-12-06 ASSESSMENT — EXTERNAL EXAM - LEFT EYE: OS_EXAM: NORMAL

## 2023-12-06 ASSESSMENT — PACHYMETRY
OD_CT(UM): 588
OS_CT(UM): 597

## 2023-12-06 ASSESSMENT — VISUAL ACUITY
OS_SC: 20/60
METHOD: SNELLEN - LINEAR

## 2023-12-06 ASSESSMENT — TONOMETRY
OS_IOP_MMHG: 24
IOP_METHOD: GOLDMANN APPLANATION

## 2023-12-06 ASSESSMENT — EXTERNAL EXAM - RIGHT EYE: OD_EXAM: NORMAL

## 2023-12-06 ASSESSMENT — SLIT LAMP EXAM - LIDS
COMMENTS: NORMAL
COMMENTS: NORMAL

## 2023-12-06 NOTE — PROGRESS NOTES
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 Sanon  s/p [facility] FP7 with STK ou  field 5/25/23: WNL OD, severe defect OS now with only central island  Iop has increased further, patient remains non-compliant with the drops  Likely risk of blindness again emphasized  continue cosopt BID OU, ,  continue vyzulta OU  continue alphagan bid OU  Start taper pf to qday x 2 weeks then stop (going up on methotrexate per rheum will try to taper off the steroids)  RECOMMEND PROCEEDING WITH CE/IOL/GATT/TUBE NEEDLING OS, OD will likely need to follow  See Dr. Sanon in the meantime. IF iop improves markedly and inflammation stays under control after stopping PF will consider defer surgery    anterior uveitis, both eyes:  patient does report some arthralgias  asymmptomatic from ocular standpoint  continue f/u with Dr. Sanon,importance of f/u stressed!    cataract, left eye  will help with adressing above  will put patient on PO pred 3 days before surgery  will discuss with Dr. 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

## 2023-12-07 ENCOUNTER — APPOINTMENT (OUTPATIENT)
Dept: OPHTHALMOLOGY | Facility: CLINIC | Age: 38
End: 2023-12-07
Payer: COMMERCIAL

## 2023-12-14 ENCOUNTER — APPOINTMENT (OUTPATIENT)
Dept: OPHTHALMOLOGY | Facility: CLINIC | Age: 38
End: 2023-12-14
Payer: COMMERCIAL

## 2023-12-14 ENCOUNTER — OFFICE VISIT (OUTPATIENT)
Dept: OPHTHALMOLOGY | Facility: CLINIC | Age: 38
End: 2023-12-14
Payer: COMMERCIAL

## 2023-12-14 DIAGNOSIS — H40.1134 PRIMARY OPEN ANGLE GLAUCOMA OF BOTH EYES, INDETERMINATE STAGE: Primary | ICD-10-CM

## 2023-12-14 PROCEDURE — 99024 POSTOP FOLLOW-UP VISIT: CPT | Performed by: OPHTHALMOLOGY

## 2023-12-14 ASSESSMENT — EXTERNAL EXAM - RIGHT EYE: OD_EXAM: NORMAL

## 2023-12-14 ASSESSMENT — CONF VISUAL FIELD
OS_SUPERIOR_TEMPORAL_RESTRICTION: 0
OS_INFERIOR_NASAL_RESTRICTION: 0
OS_SUPERIOR_NASAL_RESTRICTION: 0
OD_NORMAL: 1
OS_INFERIOR_TEMPORAL_RESTRICTION: 0
OS_NORMAL: 1
OD_INFERIOR_TEMPORAL_RESTRICTION: 0
OD_SUPERIOR_TEMPORAL_RESTRICTION: 0
OD_INFERIOR_NASAL_RESTRICTION: 0
OD_SUPERIOR_NASAL_RESTRICTION: 0

## 2023-12-14 ASSESSMENT — TONOMETRY
IOP_METHOD: GOLDMANN APPLANATION
OS_IOP_MMHG: 29
OD_IOP_MMHG: 27

## 2023-12-14 ASSESSMENT — ENCOUNTER SYMPTOMS
NEUROLOGICAL NEGATIVE: 0
RESPIRATORY NEGATIVE: 0
EYES NEGATIVE: 0
ALLERGIC/IMMUNOLOGIC NEGATIVE: 0
CONSTITUTIONAL NEGATIVE: 0
HEMATOLOGIC/LYMPHATIC NEGATIVE: 0
MUSCULOSKELETAL NEGATIVE: 0
PSYCHIATRIC NEGATIVE: 0
GASTROINTESTINAL NEGATIVE: 0
CARDIOVASCULAR NEGATIVE: 0
ENDOCRINE NEGATIVE: 0

## 2023-12-14 ASSESSMENT — EXTERNAL EXAM - LEFT EYE: OS_EXAM: NORMAL

## 2023-12-14 ASSESSMENT — PACHYMETRY
OD_CT(UM): 588
OS_CT(UM): 597

## 2023-12-14 ASSESSMENT — VISUAL ACUITY
METHOD: SNELLEN - LINEAR
OS_SC: 20/100

## 2023-12-14 ASSESSMENT — SLIT LAMP EXAM - LIDS
COMMENTS: NORMAL
COMMENTS: NORMAL

## 2023-12-14 NOTE — PROGRESS NOTES
Internal Medicine PGY-1 Resident Progress Note        PCP: Nydia Lyman MD    Date of Admission: 4/1/2019    Chief Complaint: left hip pain    Subjective:     Met pt at bedside. She said last night was terrible. She would fall asleep and wake up in intense pain at the L lateral lumbar area. She reports that she had difficulty sleeping because of the pain. No fever or chills. No chest pain or chest tightness. No diarrhea. No dysuria (no itching or burning). Overnight hypertensive w/ SBP up to 150 but otherwise VW wnl     LE dopplers negative. MRI L-hip: extensive bone marrow edema involves both sacral alae (suspect underlying insufficiency fractures), both pubic bodies, and distal superior pubic rami - consistent with stress response; a nondisplaced fracture of the left pubic body. SW for SNF placement  PT/OT for eval and treat  Lidoderm patch    Hospital Course: 79 yo F w/ PMH multiple R leg surgeries, HTN, GERD presents w few week history of L groin pain radiating to the back L hip reporting worsening w/ movement and wt bearing. No radiation down the leg. No recent trauma. Has been attending PT 3x/weekly. No fevers or chills. No abd pain. No incontinence (urinary or bowel). Recent ortho consult w/ Dr Ruben Myers, Ortho, on 2/28 found mild arthritis on X-ray. Pain refractory to voltaren, medrol, tizanidine, percocet and gabapentin.     In the ED: hypertensive w/ SBP up to 150; VS otherwise wnl; admitted for further workup    Medications:  Reviewed    Infusion Medications    dextrose       Scheduled Medications    lidocaine  1 patch Transdermal Daily    chlorthalidone  25 mg Oral Daily    furosemide  20 mg Oral Daily    gabapentin  300 mg Oral TID    pantoprazole  40 mg Oral QAM AC    losartan  50 mg Oral Daily    sodium chloride flush  10 mL Intravenous 2 times per day    enoxaparin  40 mg Subcutaneous Daily    budesonide  0.5 mg Nebulization BID    And    formoterol  20 mcg Nebulization BID Pow1 ce/iol/abic/partial gatt/ab-interno tube revision, left eye  IOP improved but still high  decrease pf to 3 x daily OS  Moxi qid OS until bottle finishes  Cosopt bid OU  Start brimonidine bid os, continue od  Vyzulta qhs od only  Stop Shield/activity restriction  Rtc sozeri 2-3 weeks for IOP check    mixed mechanism glaucoma, 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 Sanon  s/p [facility] with STK ou  field 5/25/23: WNL OD, severe defect OS now with only central island  Iop has increased further, patient remains non-compliant with the drops  Likely risk of blindness again emphasized  continue cosopt BID OU, ,  continue vyzulta OU  continue alphagan bid OU  Start taper pf to qday x 2 weeks then stop (going up on methotrexate per rheum will try to taper off the steroids)  RECOMMEND PROCEEDING WITH CE/IOL/GATT/TUBE NEEDLING OS, OD will likely need to follow  See Dr. Sanon in the meantime. IF iop improves markedly and inflammation stays under control after stopping PF will consider defer surgery    anterior uveitis, both eyes:  patient does report some arthralgias  asymmptomatic from ocular standpoint  continue f/u with Dr. Sanon,importance of f/u stressed!    cataract, left eye  will help with adressing above  will put patient on PO pred 3 days before surgery  will discuss with Dr. 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 Coexisting stress fracture of the left pubic body      Left adductor muscle complex strain. VL Extremity Venous Left   Final Result          Assessment/Plan:    Ashley Lugo, 78 y.o. female w/ Hx multiple R leg surgeries, HTN, GERD presents w few week history of L groin pain radiating to the back L hip reporting worsening w/ movement and wt bearing. Refractory to PT, voltaren, medrol, tizanidine, percocet and gabapentin. In the ED: hypertensive w/ SBP up to 150; VS otherwise wnl; admitted for further workup    L groin pain  Few wk history of L groin pain radiating to the back L hip reporting worsening w/ movement and wt bearing. Refractory to PT, voltaren, medrol, tizanidine, percocet and gabapentin. - Per Ortho: appears likely 2/2 lumbar radiculopathy; rec MRI lumbar spine  - LE dopplers negative. - MRI L-hip: extensive bone marrow edema involves both sacral alae (suspect underlying insufficiency fractures), both pubic bodies, and distal superior pubic rami - consistent with stress response; a nondisplaced fracture of the left pubic body.   - PRN acetaminophen, ketorolac, percocet, tizanidine, lidoderm patch    HTN  - cont chlorthalidone, telmisartan    GERD  - cont Protonix    Disposition  - SW for SNF placement  - PT/OT for eval and treat    DVT Prophylaxis: lovenox  Diet: DIET LOW SODIUM 2 GM;  Code Status: Full Code    Dispo - GMF    Madelin Orozco MD    I will discuss the patient with the senior resident and MD Madelin Hawkins MD  Internal Medicine Resident, PGY-1  Pager: [phone]    Addendum to Resident H& P/Progress note:  I have personally seen,examined and evaluated the patient.  I have reviewed the current history, physical findings, labs and assessment and plan and agree with note as documented by resident MD ( Zev Balbuena)  Will require PT/OT eval/ treatment and SNF placement on discharge    Roma Ceballos MD, [address]

## 2023-12-19 NOTE — PROGRESS NOTES
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 Barton  - Continue systemic immunosuppression per rheumatology, same dose of MTX    Primary open angle glaucoma of both eyes, indeterminate [id number]  - continue follow up and management per Dr Barton, nice result after surgery    Drops:  - Pred BID OD, OS TID per Dr. Barton  - Continue cosopt bid OU, brimonidine bid OD per Dr Barton     F/u in 2-3 months

## 2023-12-20 ENCOUNTER — OFFICE VISIT (OUTPATIENT)
Dept: OPHTHALMOLOGY | Facility: CLINIC | Age: 38
End: 2023-12-20
Payer: COMMERCIAL

## 2023-12-20 DIAGNOSIS — H20.9 ANTERIOR UVEITIS: Primary | ICD-10-CM

## 2023-12-20 DIAGNOSIS — H44.113 PANUVEITIS, BILATERAL: ICD-10-CM

## 2023-12-20 PROCEDURE — 92134 CPTRZ OPH DX IMG PST SGM RTA: CPT | Mod: BILATERAL PROCEDURE | Performed by: OPHTHALMOLOGY

## 2023-12-20 PROCEDURE — 99213 OFFICE O/P EST LOW 20 MIN: CPT | Performed by: OPHTHALMOLOGY

## 2023-12-20 ASSESSMENT — EXTERNAL EXAM - LEFT EYE: OS_EXAM: NORMAL

## 2023-12-20 ASSESSMENT — EXTERNAL EXAM - RIGHT EYE: OD_EXAM: NORMAL

## 2023-12-20 ASSESSMENT — PACHYMETRY
OS_CT(UM): 597
OD_CT(UM): 588

## 2023-12-20 ASSESSMENT — SLIT LAMP EXAM - LIDS
COMMENTS: NORMAL
COMMENTS: NORMAL

## 2023-12-20 ASSESSMENT — REFRACTION: OD_AXIS: 012

## 2023-12-20 ASSESSMENT — CUP TO DISC RATIO
OD_RATIO: 0.5
OS_RATIO: 0.99

## 2023-12-20 ASSESSMENT — VISUAL ACUITY
OS_SC: 20/200
METHOD: SNELLEN - LINEAR
OD_SC: 20/50

## 2023-12-20 ASSESSMENT — TONOMETRY
IOP_METHOD: GOLDMANN APPLANATION
OS_IOP_MMHG: 13
OD_IOP_MMHG: 12

## 2024-01-04 ENCOUNTER — OFFICE VISIT (OUTPATIENT)
Dept: OPHTHALMOLOGY | Facility: CLINIC | Age: 39
End: 2024-01-04
Payer: COMMERCIAL

## 2024-01-04 ENCOUNTER — APPOINTMENT (OUTPATIENT)
Dept: OPHTHALMOLOGY | Facility: CLINIC | Age: 39
End: 2024-01-04
Payer: COMMERCIAL

## 2024-01-04 DIAGNOSIS — H20.9 ANTERIOR UVEITIS: Primary | ICD-10-CM

## 2024-01-04 PROCEDURE — 99024 POSTOP FOLLOW-UP VISIT: CPT | Performed by: OPHTHALMOLOGY

## 2024-01-04 ASSESSMENT — PACHYMETRY
OD_CT(UM): 588
OS_CT(UM): 597

## 2024-01-04 ASSESSMENT — TONOMETRY
OD_IOP_MMHG: 29
OS_IOP_MMHG: 27
IOP_METHOD: GOLDMANN APPLANATION

## 2024-01-04 ASSESSMENT — ENCOUNTER SYMPTOMS
ALLERGIC/IMMUNOLOGIC NEGATIVE: 0
EYES NEGATIVE: 0
PSYCHIATRIC NEGATIVE: 0
ENDOCRINE NEGATIVE: 0
CARDIOVASCULAR NEGATIVE: 0
HEMATOLOGIC/LYMPHATIC NEGATIVE: 0
CONSTITUTIONAL NEGATIVE: 0
RESPIRATORY NEGATIVE: 0
GASTROINTESTINAL NEGATIVE: 0
NEUROLOGICAL NEGATIVE: 0
MUSCULOSKELETAL NEGATIVE: 0

## 2024-01-04 ASSESSMENT — CONF VISUAL FIELD
OS_SUPERIOR_TEMPORAL_RESTRICTION: 0
OS_NORMAL: 1
OS_INFERIOR_TEMPORAL_RESTRICTION: 0
OS_INFERIOR_NASAL_RESTRICTION: 0
OD_INFERIOR_NASAL_RESTRICTION: 0
OD_NORMAL: 1
OD_INFERIOR_TEMPORAL_RESTRICTION: 0
OD_SUPERIOR_NASAL_RESTRICTION: 0
OD_SUPERIOR_TEMPORAL_RESTRICTION: 0
OS_SUPERIOR_NASAL_RESTRICTION: 0

## 2024-01-04 ASSESSMENT — VISUAL ACUITY
OS_SC: 20/400
OS_PH_SC: 20/200
METHOD: SNELLEN - LINEAR

## 2024-01-04 ASSESSMENT — EXTERNAL EXAM - RIGHT EYE: OD_EXAM: NORMAL

## 2024-01-04 ASSESSMENT — SLIT LAMP EXAM - LIDS
COMMENTS: NORMAL
COMMENTS: NORMAL

## 2024-01-04 ASSESSMENT — EXTERNAL EXAM - LEFT EYE: OS_EXAM: NORMAL

## 2024-01-04 NOTE — PROGRESS NOTES
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 Sanon  s/p [facility] FP7 with STK ou  field 5/25/23: WNL OD, severe defect OS now with only central island  Iop has increased further, patient remains non-compliant with the drops  Likely risk of blindness again emphasized  continue cosopt BID OU, ,  continue vyzulta OU  continue alphagan bid OU  Start taper pf to qday x 2 weeks then stop (going up on methotrexate per rheum will try to taper off the steroids)  RECOMMEND PROCEEDING WITH CE/IOL/GATT/TUBE NEEDLING OS, OD will likely need to follow  See Dr. Sanon in the meantime. IF iop improves markedly and inflammation stays under control after stopping PF will consider defer surgery    anterior uveitis, both eyes:  patient does report some arthralgias  asymmptomatic from ocular standpoint  continue f/u with Dr. Sanon,importance of f/u stressed!    cataract, left eye  will help with adressing above  will put patient on PO pred 3 days before surgery  will discuss with Dr. 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

## 2024-02-12 ENCOUNTER — OFFICE VISIT (OUTPATIENT)
Dept: RHEUMATOLOGY | Facility: CLINIC | Age: 39
End: 2024-02-12
Payer: COMMERCIAL

## 2024-02-12 ENCOUNTER — LAB (OUTPATIENT)
Dept: LAB | Facility: LAB | Age: 39
End: 2024-02-12
Payer: COMMERCIAL

## 2024-02-12 VITALS
SYSTOLIC BLOOD PRESSURE: 101 MMHG | HEIGHT: 73 IN | BODY MASS INDEX: 29.82 KG/M2 | WEIGHT: 225 LBS | DIASTOLIC BLOOD PRESSURE: 67 MMHG | HEART RATE: 57 BPM | TEMPERATURE: 97.8 F

## 2024-02-12 DIAGNOSIS — H20.9 UVEITIS: ICD-10-CM

## 2024-02-12 DIAGNOSIS — H20.9 UVEITIS: Primary | ICD-10-CM

## 2024-02-12 LAB
ALBUMIN SERPL BCP-MCNC: 3.9 G/DL (ref 3.4–5)
ALP SERPL-CCNC: 65 U/L (ref 33–120)
ALT SERPL W P-5'-P-CCNC: 19 U/L (ref 10–52)
ANION GAP SERPL CALC-SCNC: 9 MMOL/L (ref 10–20)
AST SERPL W P-5'-P-CCNC: 15 U/L (ref 9–39)
BILIRUB SERPL-MCNC: 0.6 MG/DL (ref 0–1.2)
BUN SERPL-MCNC: 15 MG/DL (ref 6–23)
CALCIUM SERPL-MCNC: 9.3 MG/DL (ref 8.6–10.6)
CHLORIDE SERPL-SCNC: 106 MMOL/L (ref 98–107)
CO2 SERPL-SCNC: 29 MMOL/L (ref 21–32)
CREAT SERPL-MCNC: 0.71 MG/DL (ref 0.5–1.3)
CRP SERPL-MCNC: <0.1 MG/DL
EGFRCR SERPLBLD CKD-EPI 2021: >90 ML/MIN/1.73M*2
ERYTHROCYTE [DISTWIDTH] IN BLOOD BY AUTOMATED COUNT: 13.3 % (ref 11.5–14.5)
ERYTHROCYTE [SEDIMENTATION RATE] IN BLOOD BY WESTERGREN METHOD: <1 MM/H (ref 0–15)
GLUCOSE SERPL-MCNC: 94 MG/DL (ref 74–99)
HCT VFR BLD AUTO: 41.5 % (ref 41–52)
HGB BLD-MCNC: 14.5 G/DL (ref 13.5–17.5)
MCH RBC QN AUTO: 29.8 PG (ref 26–34)
MCHC RBC AUTO-ENTMCNC: 34.9 G/DL (ref 32–36)
MCV RBC AUTO: 85 FL (ref 80–100)
NRBC BLD-RTO: 0 /100 WBCS (ref 0–0)
PLATELET # BLD AUTO: 232 X10*3/UL (ref 150–450)
POTASSIUM SERPL-SCNC: 4.2 MMOL/L (ref 3.5–5.3)
PROT SERPL-MCNC: 6.1 G/DL (ref 6.4–8.2)
RBC # BLD AUTO: 4.87 X10*6/UL (ref 4.5–5.9)
SODIUM SERPL-SCNC: 140 MMOL/L (ref 136–145)
WBC # BLD AUTO: 4.6 X10*3/UL (ref 4.4–11.3)

## 2024-02-12 PROCEDURE — 85027 COMPLETE CBC AUTOMATED: CPT

## 2024-02-12 PROCEDURE — 99214 OFFICE O/P EST MOD 30 MIN: CPT | Performed by: STUDENT IN AN ORGANIZED HEALTH CARE EDUCATION/TRAINING PROGRAM

## 2024-02-12 PROCEDURE — 85652 RBC SED RATE AUTOMATED: CPT

## 2024-02-12 PROCEDURE — 86140 C-REACTIVE PROTEIN: CPT

## 2024-02-12 PROCEDURE — 36415 COLL VENOUS BLD VENIPUNCTURE: CPT

## 2024-02-12 PROCEDURE — 80053 COMPREHEN METABOLIC PANEL: CPT

## 2024-02-12 PROCEDURE — 1036F TOBACCO NON-USER: CPT | Performed by: STUDENT IN AN ORGANIZED HEALTH CARE EDUCATION/TRAINING PROGRAM

## 2024-02-12 RX ORDER — METHOTREXATE 2.5 MG/1
15 TABLET ORAL
Qty: 60 TABLET | Refills: 2 | Status: SHIPPED | OUTPATIENT
Start: 2024-02-12 | End: 2024-04-08 | Stop reason: SDUPTHER

## 2024-02-12 ASSESSMENT — PAIN SCALES - GENERAL: PAINLEVEL: 0-NO PAIN

## 2024-02-12 NOTE — PROGRESS NOTES
Subjective   Patient ID: [id number]   Yogi Lynn III is a 39 y.o. male who presents for No chief complaint on file..  HPI  40 yo with hx of hydradenitis suppurativa, well controlled off treatment, who was refered to us by opthalmology for bilateral anterior uveitis and mixed glaucoma(open glaucoma& inflammatory) for steroid sparing immunosuppressive regimen, is here for follow up.       Patient developed vision loss L>R more than a year ago, and has been receiving ocular steroid drops. (at first was getting intraocular steroid injections).   Infectious workup was negative. RENETTA, HLA b27 negative. ACE neg, CXR WNL.    His uveitis has been quiescent, we started the patient on Methotrexate 6 tabs with folic acid. Decreased prednisone drops, to once a day.   No ocular sxs. Reports inflammation in his eyes is gone. No SOB, no rashes, no diarrhea, no infections.  Declines vaccination.  MS minutes. No swelling in joints. Intermittent pain in finger/toes.    Patient has no family or personal hx of rheumatic disease. Smokes marrijuana, occasional alcohol, no drug use.  Denies persistent arthralgias, joint swelling, headaches, nasal, oral ulcers, worsening SOB, CP, fever, chills, raynayd, dry eyes or mouth, constant back pain, skin rash other than the HS hx.  Has no complaints other than the blurry vision in the left eye, no pain.             Objective   Physical Exam    AO*4  mild ocular erythema   no oral lesions  clear lungs  NlS1S2  no skin rashes ( axillary area not examined)  no active synovitis in joints     Assessment/Plan        40 yo with hx of hydradenitis suppurativa, well controlled off treatment, who was refereed to us by opthalmology for non infectious bilateral anterior uveitis and mixed glaucoma(open glaucoma& inflammatory) for steroid sparing immunosuppressive regimen, is here for follow up.     Patient developed vision loss L>R more than a year ago, and has been receiving ocular steroid drops. (at first  was getting intraocular steroid injections).   Infectious workup was negative. RENETTA, HLA b27 negative. ACE neg, CXR WNL.  Opthalmology suspecting Sarcoid vs HLA-B27. No extra ocular manifestations of Rheumatic diseases.   There is an association in the litterature between HS and uveitis, hence that is a possibility.   Given patient developed ocular glaucoma, continuing ocular steroids is not ideal and opthalmology are kindly asking for a steroid sparing regime. We started methotrexate 6 tabs weekly. His most recent ophthalmology exam is without inflammation.   Will reach out to ophthalmology to discuss the need to uptitrate methotrexate given that he's still on steroids drops.      Plan:  - Continue methotrexate to  6 tabs once weekly with daily folic acid  -patient to continue following with opthalmology for ocular changes   -repeat blood work    RTC in 3-4 months    Tammi Fernandes MD  Rheumatology Fellow,PGY-V    Patient seen with Dr. Daniels

## 2024-02-15 ENCOUNTER — OFFICE VISIT (OUTPATIENT)
Dept: OPHTHALMOLOGY | Facility: CLINIC | Age: 39
End: 2024-02-15
Payer: COMMERCIAL

## 2024-02-15 DIAGNOSIS — H20.9 ANTERIOR UVEITIS: Primary | ICD-10-CM

## 2024-02-15 DIAGNOSIS — H40.1134 PRIMARY OPEN ANGLE GLAUCOMA OF BOTH EYES, INDETERMINATE STAGE: ICD-10-CM

## 2024-02-15 PROCEDURE — 99024 POSTOP FOLLOW-UP VISIT: CPT | Performed by: OPHTHALMOLOGY

## 2024-02-15 ASSESSMENT — ENCOUNTER SYMPTOMS
ENDOCRINE NEGATIVE: 0
CONSTITUTIONAL NEGATIVE: 0
NEUROLOGICAL NEGATIVE: 0
ALLERGIC/IMMUNOLOGIC NEGATIVE: 0
MUSCULOSKELETAL NEGATIVE: 0
GASTROINTESTINAL NEGATIVE: 0
HEMATOLOGIC/LYMPHATIC NEGATIVE: 0
EYES NEGATIVE: 0
CARDIOVASCULAR NEGATIVE: 0
RESPIRATORY NEGATIVE: 0
PSYCHIATRIC NEGATIVE: 0

## 2024-02-15 ASSESSMENT — TONOMETRY
OS_IOP_MMHG: 26
IOP_METHOD: GOLDMANN APPLANATION
OD_IOP_MMHG: 18

## 2024-02-15 ASSESSMENT — SLIT LAMP EXAM - LIDS
COMMENTS: NORMAL
COMMENTS: NORMAL

## 2024-02-15 ASSESSMENT — VISUAL ACUITY
OD_SC: 20/80
OD_PH_SC+: -2
OD_PH_SC: 20/50
OS_SC: CF 6'
OS_PH_SC: 20/400
METHOD: SNELLEN - LINEAR

## 2024-02-15 ASSESSMENT — PACHYMETRY
OS_CT(UM): 597
OD_CT(UM): 588

## 2024-02-15 ASSESSMENT — EXTERNAL EXAM - LEFT EYE: OS_EXAM: NORMAL

## 2024-02-15 ASSESSMENT — EXTERNAL EXAM - RIGHT EYE: OD_EXAM: NORMAL

## 2024-02-15 NOTE — PROGRESS NOTES
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 Sanon  s/p [facility] FP7 with STK ou  field 5/25/23: WNL OD, severe defect OS now with only central island  Iop has increased further, patient remains non-compliant with the drops  Likely risk of blindness again emphasized  continue cosopt BID OU, ,  continue vyzulta OU  continue alphagan bid OU  Start taper pf to qday x 2 weeks then stop (going up on methotrexate per rheum will try to taper off the steroids)  RECOMMEND PROCEEDING WITH CE/IOL/GATT/TUBE NEEDLING OS, OD will likely need to follow  See Dr. Sanon in the meantime. IF iop improves markedly and inflammation stays under control after stopping PF will consider defer surgery    anterior uveitis, both eyes:  patient does report some arthralgias  asymmptomatic from ocular standpoint  continue f/u with Dr. Sanon,importance of f/u stressed!    cataract, left eye  will help with adressing above  will put patient on PO pred 3 days before surgery  will discuss with Dr. 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

## 2024-03-06 ENCOUNTER — OFFICE VISIT (OUTPATIENT)
Dept: OPHTHALMOLOGY | Facility: CLINIC | Age: 39
End: 2024-03-06
Payer: COMMERCIAL

## 2024-03-06 DIAGNOSIS — H40.42X3 UVEITIC GLAUCOMA, LEFT, SEVERE STAGE: ICD-10-CM

## 2024-03-06 DIAGNOSIS — H20.9 ANTERIOR UVEITIS: Primary | ICD-10-CM

## 2024-03-06 DIAGNOSIS — H40.1134 PRIMARY OPEN ANGLE GLAUCOMA OF BOTH EYES, INDETERMINATE STAGE: ICD-10-CM

## 2024-03-06 DIAGNOSIS — H44.113 PANUVEITIS, BILATERAL: ICD-10-CM

## 2024-03-06 DIAGNOSIS — H20.9 UVEITIC GLAUCOMA, LEFT, SEVERE STAGE: ICD-10-CM

## 2024-03-06 PROCEDURE — 99214 OFFICE O/P EST MOD 30 MIN: CPT | Performed by: OPHTHALMOLOGY

## 2024-03-06 PROCEDURE — 92134 CPTRZ OPH DX IMG PST SGM RTA: CPT | Mod: BILATERAL PROCEDURE | Performed by: OPHTHALMOLOGY

## 2024-03-06 ASSESSMENT — TONOMETRY
IOP_METHOD: TONOPEN
OD_IOP_MMHG: 18
OS_IOP_MMHG: 22

## 2024-03-06 ASSESSMENT — VISUAL ACUITY
OD_CC+: +1
OD_CC: 20/50
OS_CC: 20/200
METHOD: SNELLEN - LINEAR

## 2024-03-06 ASSESSMENT — EXTERNAL EXAM - RIGHT EYE: OD_EXAM: NORMAL

## 2024-03-06 ASSESSMENT — PACHYMETRY
OD_CT(UM): 588
OS_CT(UM): 597

## 2024-03-06 ASSESSMENT — ENCOUNTER SYMPTOMS
MUSCULOSKELETAL NEGATIVE: 0
CONSTITUTIONAL NEGATIVE: 0
ENDOCRINE NEGATIVE: 0
ALLERGIC/IMMUNOLOGIC NEGATIVE: 0
RESPIRATORY NEGATIVE: 0
HEMATOLOGIC/LYMPHATIC NEGATIVE: 0
PSYCHIATRIC NEGATIVE: 0
GASTROINTESTINAL NEGATIVE: 0
EYES NEGATIVE: 0
CARDIOVASCULAR NEGATIVE: 0
NEUROLOGICAL NEGATIVE: 0

## 2024-03-06 ASSESSMENT — CONF VISUAL FIELD
OS_INFERIOR_TEMPORAL_RESTRICTION: 0
OD_INFERIOR_TEMPORAL_RESTRICTION: 0
OS_SUPERIOR_NASAL_RESTRICTION: 0
OD_SUPERIOR_NASAL_RESTRICTION: 0
OS_SUPERIOR_TEMPORAL_RESTRICTION: 0
OD_INFERIOR_NASAL_RESTRICTION: 0
OD_SUPERIOR_TEMPORAL_RESTRICTION: 0
OS_INFERIOR_NASAL_RESTRICTION: 0
OD_NORMAL: 1
OS_NORMAL: 1

## 2024-03-06 ASSESSMENT — CUP TO DISC RATIO
OS_RATIO: 0.99
OD_RATIO: 0.5

## 2024-03-06 ASSESSMENT — SLIT LAMP EXAM - LIDS
COMMENTS: NORMAL
COMMENTS: NORMAL

## 2024-03-06 ASSESSMENT — EXTERNAL EXAM - LEFT EYE: OS_EXAM: NORMAL

## 2024-03-06 NOTE — PROGRESS NOTES
Panuveitis, [id number]  Anterior spmeaqsY11.9  - most likely underlying dx of sarcoid  - OCT today shows significantly worsened edema of the left eye.   - Has history of IOP rise on steroid drops per patient, following with Dr. Barton  - Discussed option of doing STK OS today, however patient declines. Also discussed increasing PF to daily however patient is reluctant to change due to fear of IOP increase. Will discuss management options with Dr. Barton to control both IOP and worsening edema of the left eye.    - Continue systemic immunosuppression per rheumatology, same dose of MTX    Primary open angle glaucoma of both eyes, indeterminate [id number]  - continue follow up and management per Dr Barton, nice result after surgery    Drops:  -Cosopt bid OU, Continue brimonidine bid ou, Vyzulta qhs ou, PF QODay OU      Follow up in 2 weeks.

## 2024-03-20 ENCOUNTER — OFFICE VISIT (OUTPATIENT)
Dept: OPHTHALMOLOGY | Facility: CLINIC | Age: 39
End: 2024-03-20
Payer: COMMERCIAL

## 2024-03-20 DIAGNOSIS — H20.9 ANTERIOR UVEITIS: Primary | ICD-10-CM

## 2024-03-20 PROCEDURE — 99214 OFFICE O/P EST MOD 30 MIN: CPT | Performed by: OPHTHALMOLOGY

## 2024-03-20 PROCEDURE — 67515 INJECT/TREAT EYE SOCKET: CPT | Mod: LEFT SIDE | Performed by: OPHTHALMOLOGY

## 2024-03-20 PROCEDURE — 92134 CPTRZ OPH DX IMG PST SGM RTA: CPT | Mod: BILATERAL PROCEDURE | Performed by: OPHTHALMOLOGY

## 2024-03-20 RX ORDER — TRIAMCINOLONE ACETONIDE 40 MG/ML
2.5 INJECTION, SUSPENSION INTRA-ARTICULAR; INTRAMUSCULAR
Status: COMPLETED | OUTPATIENT
Start: 2024-03-20 | End: 2024-03-20

## 2024-03-20 RX ADMIN — TRIAMCINOLONE ACETONIDE 2.5 MG: 40 INJECTION, SUSPENSION INTRA-ARTICULAR; INTRAMUSCULAR at 11:51

## 2024-03-20 ASSESSMENT — CONF VISUAL FIELD
OS_SUPERIOR_NASAL_RESTRICTION: 0
OS_INFERIOR_TEMPORAL_RESTRICTION: 0
OD_SUPERIOR_TEMPORAL_RESTRICTION: 0
OS_SUPERIOR_TEMPORAL_RESTRICTION: 0
OD_NORMAL: 1
OS_INFERIOR_NASAL_RESTRICTION: 0
OS_NORMAL: 1
OD_INFERIOR_TEMPORAL_RESTRICTION: 0
OD_INFERIOR_NASAL_RESTRICTION: 0
OD_SUPERIOR_NASAL_RESTRICTION: 0

## 2024-03-20 ASSESSMENT — ENCOUNTER SYMPTOMS
RESPIRATORY NEGATIVE: 0
MUSCULOSKELETAL NEGATIVE: 0
PSYCHIATRIC NEGATIVE: 0
CARDIOVASCULAR NEGATIVE: 0
HEMATOLOGIC/LYMPHATIC NEGATIVE: 0
CONSTITUTIONAL NEGATIVE: 0
ALLERGIC/IMMUNOLOGIC NEGATIVE: 0
EYES NEGATIVE: 0
ENDOCRINE NEGATIVE: 0
GASTROINTESTINAL NEGATIVE: 0
NEUROLOGICAL NEGATIVE: 0

## 2024-03-20 ASSESSMENT — VISUAL ACUITY
METHOD: SNELLEN - LINEAR
OD_CC+: +1
OD_CC: 20/50
OS_CC: 20/50

## 2024-03-20 ASSESSMENT — PACHYMETRY
OD_CT(UM): 588
OS_CT(UM): 597

## 2024-03-20 ASSESSMENT — TONOMETRY
OD_IOP_MMHG: 18
IOP_METHOD: TONOPEN
OS_IOP_MMHG: 21

## 2024-03-20 ASSESSMENT — CUP TO DISC RATIO
OS_RATIO: 0.99
OD_RATIO: 0.5

## 2024-03-20 ASSESSMENT — EXTERNAL EXAM - RIGHT EYE: OD_EXAM: NORMAL

## 2024-03-20 ASSESSMENT — SLIT LAMP EXAM - LIDS
COMMENTS: NORMAL
COMMENTS: NORMAL

## 2024-03-20 ASSESSMENT — EXTERNAL EXAM - LEFT EYE: OS_EXAM: NORMAL

## 2024-03-20 NOTE — PROGRESS NOTES
OCT : 03/20/24     OD: Normal Foveal Contour & Retinal laminations, EZ line preserved, (-) IRF/subretinal fluid (SRF)  OS: Large cystoid edema, mild interval improvement  (prior 731)       Panuveitis, [id number]  Anterior bfikvvgW76.9  - most likely underlying dx of sarcoid  - Has history of IOP rise on steroid drops per patient, following with Dr. Barton      - Discussed option of doing STK OS today given recalcitrant CME, discussed risk of IOP elevation and need for closer monitoring   - Pt agrees with STK OS   - Half dose (0.25cc) STK OS performed today   - Continue systemic immunosuppression per rheumatology, same dose of MTX    Primary open angle glaucoma of both eyes, indeterminate [id number]  - continue follow up and management per Dr Barton    Drops:  -Cosopt bid OU, Continue brimonidine bid ou, Vyzulta qhs ou, PF QODay OU    Follow up with retina 2 months  Follow up with Dr. Barton for IOP check next week

## 2024-03-27 ENCOUNTER — OFFICE VISIT (OUTPATIENT)
Dept: OPHTHALMOLOGY | Facility: CLINIC | Age: 39
End: 2024-03-27
Payer: COMMERCIAL

## 2024-03-27 DIAGNOSIS — H44.113 PANUVEITIS, BILATERAL: Primary | ICD-10-CM

## 2024-03-27 DIAGNOSIS — H40.1134 PRIMARY OPEN ANGLE GLAUCOMA OF BOTH EYES, INDETERMINATE STAGE: ICD-10-CM

## 2024-03-27 PROCEDURE — 92134 CPTRZ OPH DX IMG PST SGM RTA: CPT | Mod: BILATERAL PROCEDURE | Performed by: OPHTHALMOLOGY

## 2024-03-27 PROCEDURE — 99214 OFFICE O/P EST MOD 30 MIN: CPT | Performed by: OPHTHALMOLOGY

## 2024-03-27 RX ORDER — ACETAZOLAMIDE 500 MG/1
500 CAPSULE, EXTENDED RELEASE ORAL 2 TIMES DAILY
Qty: 60 CAPSULE | Refills: 0 | Status: SHIPPED | OUTPATIENT
Start: 2024-03-27 | End: 2024-05-09

## 2024-03-27 RX ORDER — LATANOPROSTENE BUNOD 0.24 MG/ML
1 SOLUTION/ DROPS OPHTHALMIC NIGHTLY
Qty: 7.5 ML | Refills: 3 | Status: SHIPPED | OUTPATIENT
Start: 2024-03-27 | End: 2024-06-06 | Stop reason: SDUPTHER

## 2024-03-27 ASSESSMENT — ENCOUNTER SYMPTOMS
CONSTITUTIONAL NEGATIVE: 0
HEMATOLOGIC/LYMPHATIC NEGATIVE: 0
MUSCULOSKELETAL NEGATIVE: 0
ALLERGIC/IMMUNOLOGIC NEGATIVE: 0
RESPIRATORY NEGATIVE: 0
GASTROINTESTINAL NEGATIVE: 0
ENDOCRINE NEGATIVE: 0
PSYCHIATRIC NEGATIVE: 0
EYES NEGATIVE: 0
NEUROLOGICAL NEGATIVE: 0
CARDIOVASCULAR NEGATIVE: 0

## 2024-03-27 ASSESSMENT — TONOMETRY
IOP_METHOD: GOLDMANN APPLANATION
OS_IOP_MMHG: 38
OD_IOP_MMHG: 32

## 2024-03-27 ASSESSMENT — REFRACTION_MANIFEST
OD_CYLINDER: +1.75
OS_CYLINDER: +1.75
OS_SPHERE: -3.25
OD_AXIS: 077
OD_SPHERE: -3.00
OS_AXIS: 108

## 2024-03-27 ASSESSMENT — EXTERNAL EXAM - LEFT EYE: OS_EXAM: NORMAL

## 2024-03-27 ASSESSMENT — CONF VISUAL FIELD
OS_SUPERIOR_TEMPORAL_RESTRICTION: 0
OD_NORMAL: 1
OS_NORMAL: 1
OS_INFERIOR_NASAL_RESTRICTION: 0
OD_INFERIOR_TEMPORAL_RESTRICTION: 0
OD_SUPERIOR_NASAL_RESTRICTION: 0
OD_SUPERIOR_TEMPORAL_RESTRICTION: 0
OS_SUPERIOR_NASAL_RESTRICTION: 0
OS_INFERIOR_TEMPORAL_RESTRICTION: 0
OD_INFERIOR_NASAL_RESTRICTION: 0

## 2024-03-27 ASSESSMENT — EXTERNAL EXAM - RIGHT EYE: OD_EXAM: NORMAL

## 2024-03-27 ASSESSMENT — VISUAL ACUITY
OD_SC: 20/70
OS_SC: 20/400
METHOD: SNELLEN - LINEAR
OD_SC+: +2

## 2024-03-27 ASSESSMENT — PACHYMETRY
OD_CT(UM): 588
OS_CT(UM): 597

## 2024-03-27 ASSESSMENT — SLIT LAMP EXAM - LIDS
COMMENTS: NORMAL
COMMENTS: NORMAL

## 2024-03-27 NOTE — PROGRESS NOTES
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 Sanon  s/p [facility] with STK ou  S/p ce/iol/abic/partial gatt/ab-interno tube revision, left eye  field 5/25/23: WNL OD, severe defect OS now with only central island  Iop has increased further, patient remains non-compliant with the drops  Likely risk of blindness again emphasized  Patient had intractable CME after last surgery, requring STK, IOP now backk up  continue cosopt BID OU, ,  continue vyzulta OU  continue alphagan bid OU  Start diamox 500 mg BID, pt told to stay hydrated, eat a banana a day   Rtc 3 weeks for IOP check    anterior uveitis, both eyes:  patient does report some arthralgias  asymmptomatic from ocular standpoint  continue f/u with Dr. Sanon,importance of f/u stressed!    cataract, left eye  will help with adressing above  will put patient on PO pred 3 days before surgery  will discuss with Dr. 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

## 2024-04-02 DIAGNOSIS — H20.9 UVEITIS: ICD-10-CM

## 2024-04-08 DIAGNOSIS — H20.9 UVEITIS: ICD-10-CM

## 2024-04-08 RX ORDER — METHOTREXATE 2.5 MG/1
TABLET ORAL
Qty: 60 TABLET | Refills: 1 | OUTPATIENT
Start: 2024-04-08

## 2024-04-08 RX ORDER — METHOTREXATE 2.5 MG/1
15 TABLET ORAL
Qty: 60 TABLET | Refills: 2 | Status: SHIPPED | OUTPATIENT
Start: 2024-04-08 | End: 2024-05-13 | Stop reason: SDUPTHER

## 2024-04-08 NOTE — TELEPHONE ENCOUNTER
Patient would like a refill on Methotrexate at [facility] on [address]

## 2024-04-25 ENCOUNTER — APPOINTMENT (OUTPATIENT)
Dept: OPHTHALMOLOGY | Facility: CLINIC | Age: 39
End: 2024-04-25
Payer: COMMERCIAL

## 2024-05-02 ENCOUNTER — OFFICE VISIT (OUTPATIENT)
Dept: OPHTHALMOLOGY | Facility: CLINIC | Age: 39
End: 2024-05-02
Payer: COMMERCIAL

## 2024-05-02 ENCOUNTER — APPOINTMENT (OUTPATIENT)
Dept: OPHTHALMOLOGY | Facility: CLINIC | Age: 39
End: 2024-05-02
Payer: COMMERCIAL

## 2024-05-02 DIAGNOSIS — H40.42X3 UVEITIC GLAUCOMA, LEFT, SEVERE STAGE: Primary | ICD-10-CM

## 2024-05-02 DIAGNOSIS — H42 GLAUCOMA OF RIGHT EYE ASSOCIATED WITH UNDERLYING DISEASE: ICD-10-CM

## 2024-05-02 DIAGNOSIS — H20.9 UVEITIC GLAUCOMA, LEFT, SEVERE STAGE: Primary | ICD-10-CM

## 2024-05-02 PROCEDURE — 99214 OFFICE O/P EST MOD 30 MIN: CPT | Performed by: OPHTHALMOLOGY

## 2024-05-02 ASSESSMENT — VISUAL ACUITY
OS_SC: 20/400
OD_SC+: +2
OS_PH_SC: 20/200
OD_PH_SC: 20/50
OD_SC: 20/80
OD_PH_SC+: +2
METHOD: SNELLEN - LINEAR

## 2024-05-02 ASSESSMENT — CONF VISUAL FIELD
OD_SUPERIOR_TEMPORAL_RESTRICTION: 0
OD_INFERIOR_NASAL_RESTRICTION: 0
OS_INFERIOR_TEMPORAL_RESTRICTION: 0
OS_INFERIOR_NASAL_RESTRICTION: 0
OD_SUPERIOR_NASAL_RESTRICTION: 0
OS_SUPERIOR_TEMPORAL_RESTRICTION: 0
OS_SUPERIOR_NASAL_RESTRICTION: 0
OD_NORMAL: 1
OD_INFERIOR_TEMPORAL_RESTRICTION: 0
OS_NORMAL: 1

## 2024-05-02 ASSESSMENT — SLIT LAMP EXAM - LIDS
COMMENTS: NORMAL
COMMENTS: NORMAL

## 2024-05-02 ASSESSMENT — TONOMETRY
IOP_METHOD: GOLDMANN APPLANATION
OD_IOP_MMHG: 21
OS_IOP_MMHG: 36

## 2024-05-02 ASSESSMENT — ENCOUNTER SYMPTOMS
RESPIRATORY NEGATIVE: 0
ENDOCRINE NEGATIVE: 0
ALLERGIC/IMMUNOLOGIC NEGATIVE: 0
CONSTITUTIONAL NEGATIVE: 0
CARDIOVASCULAR NEGATIVE: 0
GASTROINTESTINAL NEGATIVE: 0
NEUROLOGICAL NEGATIVE: 0
HEMATOLOGIC/LYMPHATIC NEGATIVE: 0
MUSCULOSKELETAL NEGATIVE: 0
PSYCHIATRIC NEGATIVE: 0
EYES NEGATIVE: 0

## 2024-05-02 ASSESSMENT — PACHYMETRY
OD_CT(UM): 588
OS_CT(UM): 597

## 2024-05-02 ASSESSMENT — EXTERNAL EXAM - LEFT EYE: OS_EXAM: NORMAL

## 2024-05-02 ASSESSMENT — EXTERNAL EXAM - RIGHT EYE: OD_EXAM: NORMAL

## 2024-05-02 NOTE — PROGRESS NOTES
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 Sanon  s/p [facility]7 with STK ou  S/p ce/iol/abic/partial gatt/ab-interno tube revision, left eye  field 5/25/23: WNL OD, severe defect OS now with only central island  Iop remains too high OS, patient has not been taking diamox  Likely risk of blindness again emphasized  Long discussion, we have limited options for OS going forward and visual prognosis is grim  Recommend proceeding with clear path as a 2nd tube shunt vs tube exchange, patient unwilling to proceed, states he will take diamox instead  continue cosopt BID OU, ,  continue vyzulta OU  continue alphagan bid OU  Start diamox 500 mg BID, pt told to stay hydrated, eat a banana a day   Rtc 3-4 weeks for IOP check    anterior uveitis, both eyes:  Remains quiet  continue f/u with Dr. Sanon,importance of f/u stressed!    cataract, right eye  monitor

## 2024-05-06 DIAGNOSIS — H20.9 UVEITIS: ICD-10-CM

## 2024-05-06 RX ORDER — FOLIC ACID 1 MG/1
1 TABLET ORAL DAILY
Qty: 90 TABLET | Refills: 1 | Status: SHIPPED | OUTPATIENT
Start: 2024-05-06 | End: 2024-05-13 | Stop reason: SDUPTHER

## 2024-05-09 DIAGNOSIS — H40.1134 PRIMARY OPEN ANGLE GLAUCOMA OF BOTH EYES, INDETERMINATE STAGE: ICD-10-CM

## 2024-05-09 RX ORDER — ACETAZOLAMIDE 500 MG/1
500 CAPSULE, EXTENDED RELEASE ORAL 2 TIMES DAILY
Qty: 60 CAPSULE | Refills: 0 | Status: SHIPPED | OUTPATIENT
Start: 2024-05-09 | End: 2024-06-06

## 2024-05-12 NOTE — PROGRESS NOTES
Subjective   Patient ID: [id number]   Yogi Lynn III is a 39 y.o. male who presents for No chief complaint on file..  HPI  40 yo with hx of hydradenitis suppurativa, well controlled off treatment, who was refered to us by opthalmology for bilateral anterior uveitis and mixed glaucoma(open glaucoma& inflammatory) for steroid sparing immunosuppressive regimen, is here for follow up.       Patient developed vision loss L>R more than a year ago, and has been receiving ocular steroid drops. (at first was getting intraocular steroid injections).   Infectious workup was negative. RENETTA, HLA b27 negative. ACE neg, CXR WNL.    His uveitis has been quiescent, we started the patient on Methotrexate 6 tabs with folic acid. Decreased prednisone drops, to once every other day. Vision in right eye okay, less so in the left eye(only 10%)  No ocular sxs. Reports inflammation in his eyes is gone. No worsening SOB, no rashes, no diarrhea, no infections.  Declines vaccination.  MS minutes. No swelling in joints. Intermittent pain in finger/toes.    Patient has no family or personal hx of rheumatic disease. Smokes marrijuana, occasional alcohol, no drug use.  Denies persistent arthralgias, joint swelling, headaches, nasal, oral ulcers, worsening SOB, CP, fever, chills, raynayd, dry eyes or mouth, constant back pain, skin rash other than the HS hx.  Has no complaints other than the blurry vision in the left eye, no pain.             Objective   Physical Exam    AO*4  mild ocular erythema   no oral lesions  clear lungs  NlS1S2  no skin rashes ( axillary area not examined)  no active synovitis in joints     Assessment/Plan        40 yo with hx of hydradenitis suppurativa, well controlled off treatment, who was refereed to us by opthalmology for non infectious bilateral anterior uveitis and mixed glaucoma(open glaucoma& inflammatory) for steroid sparing immunosuppressive regimen, is here for follow up.     Patient developed vision loss L>R  more than a year ago, and has been receiving ocular steroid drops. (at first was getting intraocular steroid injections).   Infectious workup was negative. RENETTA, HLA b27 negative. ACE neg, CXR WNL.  Opthalmology suspecting Sarcoid vs HLA-B27. No extra ocular manifestations of Rheumatic diseases.   There is an association in the litterature between HS and uveitis, hence that is a possibility.   Given patient developed ocular glaucoma, continuing ocular steroids is not ideal and opthalmology are kindly asking for a steroid sparing regime. We started methotrexate 6 tabs weekly. His most recent ophthalmology exam is without inflammation.   Will reach out to ophthalmology to discuss the need to uptitrate methotrexate given that he's still on steroids drops(now EOD).      Plan:  - Continue methotrexate to  6 tabs once weekly with daily folic acid  -patient to continue following with opthalmology for ocular changes   -repeat blood work    RTC in 3-4 months    Tammi Fernandes MD  Rheumatology Fellow,PGY-V    Patient seen with Dr. Daniels

## 2024-05-13 ENCOUNTER — OFFICE VISIT (OUTPATIENT)
Dept: RHEUMATOLOGY | Facility: CLINIC | Age: 39
End: 2024-05-13
Payer: COMMERCIAL

## 2024-05-13 ENCOUNTER — LAB (OUTPATIENT)
Dept: LAB | Facility: LAB | Age: 39
End: 2024-05-13
Payer: COMMERCIAL

## 2024-05-13 VITALS
HEIGHT: 73 IN | HEART RATE: 60 BPM | BODY MASS INDEX: 28.89 KG/M2 | WEIGHT: 218 LBS | TEMPERATURE: 98.2 F | DIASTOLIC BLOOD PRESSURE: 67 MMHG | SYSTOLIC BLOOD PRESSURE: 112 MMHG

## 2024-05-13 DIAGNOSIS — H20.9 UVEITIS: ICD-10-CM

## 2024-05-13 LAB
ALBUMIN SERPL BCP-MCNC: 3.9 G/DL (ref 3.4–5)
ALP SERPL-CCNC: 86 U/L (ref 33–120)
ALT SERPL W P-5'-P-CCNC: 21 U/L (ref 10–52)
ANION GAP SERPL CALC-SCNC: 11 MMOL/L (ref 10–20)
AST SERPL W P-5'-P-CCNC: 17 U/L (ref 9–39)
BILIRUB SERPL-MCNC: 0.4 MG/DL (ref 0–1.2)
BUN SERPL-MCNC: 15 MG/DL (ref 6–23)
CALCIUM SERPL-MCNC: 9.1 MG/DL (ref 8.6–10.6)
CHLORIDE SERPL-SCNC: 111 MMOL/L (ref 98–107)
CO2 SERPL-SCNC: 22 MMOL/L (ref 21–32)
CREAT SERPL-MCNC: 0.86 MG/DL (ref 0.5–1.3)
CRP SERPL-MCNC: <0.1 MG/DL
EGFRCR SERPLBLD CKD-EPI 2021: >90 ML/MIN/1.73M*2
ERYTHROCYTE [DISTWIDTH] IN BLOOD BY AUTOMATED COUNT: 13.9 % (ref 11.5–14.5)
ERYTHROCYTE [SEDIMENTATION RATE] IN BLOOD BY WESTERGREN METHOD: 5 MM/H (ref 0–15)
GLUCOSE SERPL-MCNC: 99 MG/DL (ref 74–99)
HCT VFR BLD AUTO: 38.1 % (ref 41–52)
HGB BLD-MCNC: 13.5 G/DL (ref 13.5–17.5)
MCH RBC QN AUTO: 29.1 PG (ref 26–34)
MCHC RBC AUTO-ENTMCNC: 35.4 G/DL (ref 32–36)
MCV RBC AUTO: 82 FL (ref 80–100)
NRBC BLD-RTO: 0 /100 WBCS (ref 0–0)
PLATELET # BLD AUTO: 290 X10*3/UL (ref 150–450)
POTASSIUM SERPL-SCNC: 4 MMOL/L (ref 3.5–5.3)
PROT SERPL-MCNC: 6.3 G/DL (ref 6.4–8.2)
RBC # BLD AUTO: 4.64 X10*6/UL (ref 4.5–5.9)
SODIUM SERPL-SCNC: 140 MMOL/L (ref 136–145)
WBC # BLD AUTO: 4 X10*3/UL (ref 4.4–11.3)

## 2024-05-13 PROCEDURE — 86140 C-REACTIVE PROTEIN: CPT

## 2024-05-13 PROCEDURE — 80053 COMPREHEN METABOLIC PANEL: CPT

## 2024-05-13 PROCEDURE — 85027 COMPLETE CBC AUTOMATED: CPT

## 2024-05-13 PROCEDURE — 36415 COLL VENOUS BLD VENIPUNCTURE: CPT

## 2024-05-13 PROCEDURE — 1036F TOBACCO NON-USER: CPT | Performed by: STUDENT IN AN ORGANIZED HEALTH CARE EDUCATION/TRAINING PROGRAM

## 2024-05-13 PROCEDURE — 99214 OFFICE O/P EST MOD 30 MIN: CPT | Performed by: STUDENT IN AN ORGANIZED HEALTH CARE EDUCATION/TRAINING PROGRAM

## 2024-05-13 PROCEDURE — 85652 RBC SED RATE AUTOMATED: CPT

## 2024-05-13 RX ORDER — METHOTREXATE 2.5 MG/1
15 TABLET ORAL
Qty: 60 TABLET | Refills: 2 | Status: SHIPPED | OUTPATIENT
Start: 2024-05-13

## 2024-05-13 RX ORDER — FOLIC ACID 1 MG/1
1 TABLET ORAL DAILY
Qty: 90 TABLET | Refills: 1 | Status: SHIPPED | OUTPATIENT
Start: 2024-05-13

## 2024-05-13 ASSESSMENT — PAIN SCALES - GENERAL: PAINLEVEL: 0-NO PAIN

## 2024-05-29 ENCOUNTER — OFFICE VISIT (OUTPATIENT)
Dept: OPHTHALMOLOGY | Facility: CLINIC | Age: 39
End: 2024-05-29
Payer: COMMERCIAL

## 2024-05-29 DIAGNOSIS — H40.42X3 UVEITIC GLAUCOMA, LEFT, SEVERE STAGE: Primary | ICD-10-CM

## 2024-05-29 DIAGNOSIS — H20.9 UVEITIC GLAUCOMA, LEFT, SEVERE STAGE: Primary | ICD-10-CM

## 2024-05-29 PROCEDURE — 99213 OFFICE O/P EST LOW 20 MIN: CPT | Performed by: OPHTHALMOLOGY

## 2024-05-29 PROCEDURE — 92134 CPTRZ OPH DX IMG PST SGM RTA: CPT | Performed by: OPHTHALMOLOGY

## 2024-05-29 ASSESSMENT — SLIT LAMP EXAM - LIDS
COMMENTS: NORMAL
COMMENTS: NORMAL

## 2024-05-29 ASSESSMENT — ENCOUNTER SYMPTOMS
RESPIRATORY NEGATIVE: 0
PSYCHIATRIC NEGATIVE: 0
ENDOCRINE NEGATIVE: 0
EYES NEGATIVE: 1
HEMATOLOGIC/LYMPHATIC NEGATIVE: 0
CONSTITUTIONAL NEGATIVE: 0
MUSCULOSKELETAL NEGATIVE: 0
NEUROLOGICAL NEGATIVE: 0
ALLERGIC/IMMUNOLOGIC NEGATIVE: 0
CARDIOVASCULAR NEGATIVE: 0
GASTROINTESTINAL NEGATIVE: 0

## 2024-05-29 ASSESSMENT — VISUAL ACUITY
OD_CC: 20/50
OD_CC+: -2
METHOD: SNELLEN - LINEAR
OS_CC: 20/40
OS_CC+: -2

## 2024-05-29 ASSESSMENT — PACHYMETRY
OD_CT(UM): 588
OS_CT(UM): 597

## 2024-05-29 ASSESSMENT — EXTERNAL EXAM - LEFT EYE: OS_EXAM: NORMAL

## 2024-05-29 ASSESSMENT — TONOMETRY
IOP_METHOD: GOLDMANN APPLANATION
OD_IOP_MMHG: 23
OS_IOP_MMHG: 28

## 2024-05-29 ASSESSMENT — EXTERNAL EXAM - RIGHT EYE: OD_EXAM: NORMAL

## 2024-05-29 ASSESSMENT — CUP TO DISC RATIO
OD_RATIO: 0.5
OS_RATIO: 0.99

## 2024-05-29 NOTE — PROGRESS NOTES
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 RENETTA, HLA b27 negative. ACE neg, CXR WNL   - Has history of IOP rise on steroid drops per patient, following with Dr. Barton      - Discussed option of doing STK OS today given recalcitrant CME, discussed risk of IOP elevation and need for closer monitoring   - Pt agrees with STK OS   - Half dose (0.25cc) STK OS performed 3/20/24 with great result  - Continue systemic immunosuppression per rheumatology, same dose of MTX  - IOP remains elevated, however patient reports that he occasionally forgets to take BID    Primary open angle glaucoma of both eyes, indeterminate [id number]  - continue follow up and management per Dr Barton    Plan  -Consider PPV with tube in vitreous cavity  -Continue drops and PO Diamox - Cosopt bid OU, brimonidine bid ou, Vyzulta qhs ou, PF QODay OU, Diamox 500mg BID - emphasized importance of compliance.    Follow up with retina 2 months  Follow up with Dr. Barton

## 2024-06-06 ENCOUNTER — OFFICE VISIT (OUTPATIENT)
Dept: OPHTHALMOLOGY | Facility: CLINIC | Age: 39
End: 2024-06-06
Payer: COMMERCIAL

## 2024-06-06 DIAGNOSIS — H20.9 UVEITIC GLAUCOMA, LEFT, SEVERE STAGE: Primary | ICD-10-CM

## 2024-06-06 DIAGNOSIS — H40.42X3 UVEITIC GLAUCOMA, LEFT, SEVERE STAGE: Primary | ICD-10-CM

## 2024-06-06 DIAGNOSIS — H40.1134 PRIMARY OPEN ANGLE GLAUCOMA OF BOTH EYES, INDETERMINATE STAGE: ICD-10-CM

## 2024-06-06 PROCEDURE — 99214 OFFICE O/P EST MOD 30 MIN: CPT | Performed by: OPHTHALMOLOGY

## 2024-06-06 RX ORDER — ACETAZOLAMIDE 500 MG/1
500 CAPSULE, EXTENDED RELEASE ORAL 2 TIMES DAILY
Qty: 60 CAPSULE | Refills: 0 | Status: SHIPPED | OUTPATIENT
Start: 2024-06-06 | End: 2024-06-06 | Stop reason: SDUPTHER

## 2024-06-06 RX ORDER — ACETAZOLAMIDE 500 MG/1
1000 CAPSULE, EXTENDED RELEASE ORAL 2 TIMES DAILY
Qty: 120 CAPSULE | Refills: 2 | Status: SHIPPED | OUTPATIENT
Start: 2024-06-06 | End: 2024-09-04

## 2024-06-06 RX ORDER — BRIMONIDINE TARTRATE 1 MG/ML
1 SOLUTION/ DROPS OPHTHALMIC 2 TIMES DAILY
Qty: 30 ML | Refills: 3 | Status: SHIPPED | OUTPATIENT
Start: 2024-06-06 | End: 2025-06-06

## 2024-06-06 RX ORDER — LATANOPROSTENE BUNOD 0.24 MG/ML
1 SOLUTION/ DROPS OPHTHALMIC NIGHTLY
Qty: 7.5 ML | Refills: 3 | Status: SHIPPED | OUTPATIENT
Start: 2024-06-06 | End: 2025-06-06

## 2024-06-06 RX ORDER — PREDNISOLONE ACETATE 10 MG/ML
1 SUSPENSION/ DROPS OPHTHALMIC EVERY OTHER DAY
Qty: 30 ML | Refills: 3 | Status: SHIPPED | OUTPATIENT
Start: 2024-06-06 | End: 2024-08-05

## 2024-06-06 RX ORDER — DORZOLAMIDE HYDROCHLORIDE AND TIMOLOL MALEATE 20; 5 MG/ML; MG/ML
1 SOLUTION/ DROPS OPHTHALMIC 2 TIMES DAILY
Qty: 30 ML | Refills: 3 | Status: SHIPPED | OUTPATIENT
Start: 2024-06-06 | End: 2025-06-06

## 2024-06-06 ASSESSMENT — PACHYMETRY
OS_CT(UM): 597
OD_CT(UM): 588

## 2024-06-06 ASSESSMENT — ENCOUNTER SYMPTOMS
RESPIRATORY NEGATIVE: 0
PSYCHIATRIC NEGATIVE: 0
ALLERGIC/IMMUNOLOGIC NEGATIVE: 0
MUSCULOSKELETAL NEGATIVE: 0
HEMATOLOGIC/LYMPHATIC NEGATIVE: 0
NEUROLOGICAL NEGATIVE: 0
CARDIOVASCULAR NEGATIVE: 0
GASTROINTESTINAL NEGATIVE: 0
CONSTITUTIONAL NEGATIVE: 0
ENDOCRINE NEGATIVE: 0
EYES NEGATIVE: 0

## 2024-06-06 ASSESSMENT — VISUAL ACUITY
OS_CC: 20/50
CORRECTION_TYPE: GLASSES
OS_CC+: -1
METHOD: SNELLEN - LINEAR
OD_CC: 20/40
OD_CC+: -2

## 2024-06-06 ASSESSMENT — CUP TO DISC RATIO
OD_RATIO: 0.5
OS_RATIO: 0.99

## 2024-06-06 ASSESSMENT — EXTERNAL EXAM - RIGHT EYE: OD_EXAM: NORMAL

## 2024-06-06 ASSESSMENT — TONOMETRY
OS_IOP_MMHG: 27
OD_IOP_MMHG: 15
IOP_METHOD: GOLDMANN APPLANATION

## 2024-06-06 ASSESSMENT — EXTERNAL EXAM - LEFT EYE: OS_EXAM: NORMAL

## 2024-06-06 ASSESSMENT — SLIT LAMP EXAM - LIDS
COMMENTS: NORMAL
COMMENTS: NORMAL

## 2024-06-06 NOTE — PROGRESS NOTES
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 Sanon  s/p [facility] FP7 with STK ou  S/p ce/iol/abic/partial gatt/ab-interno tube revision, left eye  field 5/25/23: WNL OD, severe defect OS now with only central island  Iop remains too high OS, patient has not been taking diamox  Likely risk of blindness again emphasized  Long discussion, we have limited options for OS going forward and visual prognosis is grim  Recommend proceeding with clear path as a 2nd tube shunt vs tube exchange, patient unwilling to proceed, states he will take diamox instead  IOP has improved with diamox but not yet at goal, patietn remains adverse to surgery  continue cosopt BID OU, ,  continue vyzulta OU  continue alphagan bid OU  Increase diamox 1000 mg BID, pt told to stay hydrated, eat a banana a day   Rtc 4-6 weeks for IOP check    anterior uveitis, both eyes:  Cme, left eye  Remains improved but not resolved OS  Continu pred qod  continue f/u with Dr. Sanon,importance of f/u stressed!    cataract, right eye  monitor

## 2024-06-21 ENCOUNTER — APPOINTMENT (OUTPATIENT)
Dept: PRIMARY CARE | Facility: CLINIC | Age: 39
End: 2024-06-21
Payer: COMMERCIAL

## 2024-07-09 ENCOUNTER — APPOINTMENT (OUTPATIENT)
Dept: PRIMARY CARE | Facility: CLINIC | Age: 39
End: 2024-07-09
Payer: COMMERCIAL

## 2024-07-10 ENCOUNTER — TELEPHONE (OUTPATIENT)
Dept: RHEUMATOLOGY | Facility: CLINIC | Age: 39
End: 2024-07-10

## 2024-07-10 DIAGNOSIS — H20.9 UVEITIS: ICD-10-CM

## 2024-07-10 RX ORDER — METHOTREXATE 2.5 MG/1
15 TABLET ORAL
Qty: 60 TABLET | Refills: 1 | Status: SHIPPED | OUTPATIENT
Start: 2024-07-14

## 2024-07-10 NOTE — TELEPHONE ENCOUNTER
PATIENT IS REQUESTING A REFILL ON METHOTREXATE 2.5MG. PLEASE SEND TO [facility] -[phone].

## 2024-07-11 ENCOUNTER — APPOINTMENT (OUTPATIENT)
Dept: OPHTHALMOLOGY | Facility: CLINIC | Age: 39
End: 2024-07-11
Payer: COMMERCIAL

## 2024-07-11 DIAGNOSIS — H40.42X3 UVEITIC GLAUCOMA, LEFT, SEVERE STAGE: Primary | ICD-10-CM

## 2024-07-11 DIAGNOSIS — H25.11 AGE-RELATED NUCLEAR CATARACT OF RIGHT EYE: ICD-10-CM

## 2024-07-11 DIAGNOSIS — H20.9 UVEITIC GLAUCOMA, LEFT, SEVERE STAGE: Primary | ICD-10-CM

## 2024-07-11 PROCEDURE — 99214 OFFICE O/P EST MOD 30 MIN: CPT | Performed by: OPHTHALMOLOGY

## 2024-07-11 ASSESSMENT — TONOMETRY
IOP_METHOD: GOLDMANN APPLANATION
OD_IOP_MMHG: 21
OS_IOP_MMHG: 36

## 2024-07-11 ASSESSMENT — CONF VISUAL FIELD
OD_NORMAL: 1
OS_INFERIOR_NASAL_RESTRICTION: 0
OS_NORMAL: 1
OD_SUPERIOR_NASAL_RESTRICTION: 0
OD_INFERIOR_TEMPORAL_RESTRICTION: 0
OS_INFERIOR_TEMPORAL_RESTRICTION: 0
OS_SUPERIOR_NASAL_RESTRICTION: 0
OD_INFERIOR_NASAL_RESTRICTION: 0
OS_SUPERIOR_TEMPORAL_RESTRICTION: 0
OD_SUPERIOR_TEMPORAL_RESTRICTION: 0

## 2024-07-11 ASSESSMENT — REFRACTION_WEARINGRX
OD_CYLINDER: +1.75
OD_AXIS: 077
OS_CYLINDER: +1.75
OS_SPHERE: -3.25
OS_AXIS: 108
OD_SPHERE: -3.00

## 2024-07-11 ASSESSMENT — PACHYMETRY
OS_CT(UM): 597
OD_CT(UM): 588

## 2024-07-11 ASSESSMENT — VISUAL ACUITY
OD_CC+: -2
METHOD: SNELLEN - LINEAR
OS_CC: 20/60
OD_CC: 20/25
CORRECTION_TYPE: GLASSES

## 2024-07-11 ASSESSMENT — SLIT LAMP EXAM - LIDS
COMMENTS: NORMAL
COMMENTS: NORMAL

## 2024-07-11 ASSESSMENT — EXTERNAL EXAM - LEFT EYE: OS_EXAM: NORMAL

## 2024-07-11 ASSESSMENT — EXTERNAL EXAM - RIGHT EYE: OD_EXAM: NORMAL

## 2024-07-11 NOTE — PROGRESS NOTES
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 Sanon  s/p [facility] FP7 with STK ou  S/p ce/iol/abic/partial gatt/ab-interno tube revision, left eye  field 5/25/23: WNL OD, severe defect OS now with only central island  Iop remains too high OS, patient has not been taking diamox or drops as instructed  Likely risk of blindness again emphasized  Long discussion, we have limited options for OS going forward and visual prognosis is grim  Recommend proceeding with clear path tube exchange first he was agreeable and then refused due to travel plans. I have disclosed to him that he is high risk for blindness without intervention and that I need to do as soon as possible as I will be on leave October - January. He states he will discuss with his girlfriend and let us know his decision.     continue cosopt BID OU, ,  continue vyzulta OU  continue alphagan bid OU  Increase diamox 1000 mg BID, pt told to stay hydrated, eat a banana a day   Compliance stressed       anterior uveitis, both eyes:  Cme, left eye  Remains improved but not resolved OS  Continu pred qod  continue f/u with Dr. Sanon,importance of f/u stressed!    cataract, right eye  monitor

## 2024-07-12 DIAGNOSIS — H40.1134 PRIMARY OPEN ANGLE GLAUCOMA OF BOTH EYES, INDETERMINATE STAGE: ICD-10-CM

## 2024-07-12 RX ORDER — ACETAZOLAMIDE 500 MG/1
1000 CAPSULE, EXTENDED RELEASE ORAL 2 TIMES DAILY
Qty: 120 CAPSULE | Refills: 0 | Status: SHIPPED | OUTPATIENT
Start: 2024-07-12 | End: 2024-08-11

## 2024-07-15 ENCOUNTER — APPOINTMENT (OUTPATIENT)
Dept: PRIMARY CARE | Facility: CLINIC | Age: 39
End: 2024-07-15
Payer: COMMERCIAL

## 2024-07-17 DIAGNOSIS — H20.9 UVEITIC GLAUCOMA OF LEFT EYE, SEVERE STAGE: Primary | ICD-10-CM

## 2024-07-17 DIAGNOSIS — H40.42X3 UVEITIC GLAUCOMA OF LEFT EYE, SEVERE STAGE: Primary | ICD-10-CM

## 2024-07-17 DIAGNOSIS — N52.9 MALE ERECTILE DYSFUNCTION, UNSPECIFIED: ICD-10-CM

## 2024-07-17 RX ORDER — MOXIFLOXACIN 5 MG/ML
1 SOLUTION/ DROPS OPHTHALMIC
OUTPATIENT
Start: 2024-07-17 | End: 2024-07-17

## 2024-07-17 RX ORDER — SODIUM CHLORIDE 9 MG/ML
100 INJECTION, SOLUTION INTRAVENOUS CONTINUOUS
OUTPATIENT
Start: 2024-07-17

## 2024-07-19 RX ORDER — TADALAFIL 5 MG/1
5 TABLET ORAL DAILY
Qty: 90 TABLET | Refills: 3 | OUTPATIENT
Start: 2024-07-19

## 2024-07-22 ENCOUNTER — ANESTHESIA (OUTPATIENT)
Dept: OPERATING ROOM | Facility: CLINIC | Age: 39
End: 2024-07-22
Payer: COMMERCIAL

## 2024-07-22 ENCOUNTER — HOSPITAL ENCOUNTER (OUTPATIENT)
Facility: CLINIC | Age: 39
Setting detail: OUTPATIENT SURGERY
Discharge: HOME | End: 2024-07-22
Attending: OPHTHALMOLOGY | Admitting: OPHTHALMOLOGY
Payer: COMMERCIAL

## 2024-07-22 ENCOUNTER — ANESTHESIA EVENT (OUTPATIENT)
Dept: OPERATING ROOM | Facility: CLINIC | Age: 39
End: 2024-07-22
Payer: COMMERCIAL

## 2024-07-22 VITALS
HEART RATE: 55 BPM | RESPIRATION RATE: 16 BRPM | OXYGEN SATURATION: 98 % | BODY MASS INDEX: 29.28 KG/M2 | WEIGHT: 220.9 LBS | TEMPERATURE: 97.2 F | HEIGHT: 73 IN | SYSTOLIC BLOOD PRESSURE: 151 MMHG | DIASTOLIC BLOOD PRESSURE: 86 MMHG

## 2024-07-22 DIAGNOSIS — H40.42X3 UVEITIC GLAUCOMA OF LEFT EYE, SEVERE STAGE: Primary | ICD-10-CM

## 2024-07-22 DIAGNOSIS — H20.9 UVEITIC GLAUCOMA OF LEFT EYE, SEVERE STAGE: Primary | ICD-10-CM

## 2024-07-22 PROCEDURE — 7100000009 HC PHASE TWO TIME - INITIAL BASE CHARGE: Performed by: OPHTHALMOLOGY

## 2024-07-22 PROCEDURE — 7100000001 HC RECOVERY ROOM TIME - INITIAL BASE CHARGE: Performed by: OPHTHALMOLOGY

## 2024-07-22 PROCEDURE — 2500000005 HC RX 250 GENERAL PHARMACY W/O HCPCS: Mod: SE

## 2024-07-22 PROCEDURE — 3700000002 HC GENERAL ANESTHESIA TIME - EACH INCREMENTAL 1 MINUTE: Performed by: OPHTHALMOLOGY

## 2024-07-22 PROCEDURE — 7100000002 HC RECOVERY ROOM TIME - EACH INCREMENTAL 1 MINUTE: Performed by: OPHTHALMOLOGY

## 2024-07-22 PROCEDURE — 2500000005 HC RX 250 GENERAL PHARMACY W/O HCPCS: Mod: SE | Performed by: OPHTHALMOLOGY

## 2024-07-22 PROCEDURE — 3700000001 HC GENERAL ANESTHESIA TIME - INITIAL BASE CHARGE: Performed by: OPHTHALMOLOGY

## 2024-07-22 PROCEDURE — L8609 ARTIFICIAL CORNEA: HCPCS | Performed by: OPHTHALMOLOGY

## 2024-07-22 PROCEDURE — 65920 REMOVE IMPLANT OF EYE: CPT | Performed by: OPHTHALMOLOGY

## 2024-07-22 PROCEDURE — 7100000010 HC PHASE TWO TIME - EACH INCREMENTAL 1 MINUTE: Performed by: OPHTHALMOLOGY

## 2024-07-22 PROCEDURE — 2780000003 HC OR 278 NO HCPCS: Performed by: OPHTHALMOLOGY

## 2024-07-22 PROCEDURE — 66180 AQUEOUS SHUNT EYE W/GRAFT: CPT | Performed by: OPHTHALMOLOGY

## 2024-07-22 PROCEDURE — 3600000002 HC OR TIME - INITIAL BASE CHARGE - PROCEDURE LEVEL TWO: Performed by: OPHTHALMOLOGY

## 2024-07-22 PROCEDURE — 3600000007 HC OR TIME - EACH INCREMENTAL 1 MINUTE - PROCEDURE LEVEL TWO: Performed by: OPHTHALMOLOGY

## 2024-07-22 PROCEDURE — 2500000004 HC RX 250 GENERAL PHARMACY W/ HCPCS (ALT 636 FOR OP/ED): Mod: SE

## 2024-07-22 PROCEDURE — 2500000001 HC RX 250 WO HCPCS SELF ADMINISTERED DRUGS (ALT 637 FOR MEDICARE OP): Mod: SE | Performed by: OPHTHALMOLOGY

## 2024-07-22 DEVICE — TUTOPLAST, PROCESSED, PERICARDIUM: Type: IMPLANTABLE DEVICE | Site: EYE | Status: FUNCTIONAL

## 2024-07-22 DEVICE — IMPLANTABLE DEVICE: Type: IMPLANTABLE DEVICE | Site: EYE | Status: FUNCTIONAL

## 2024-07-22 RX ORDER — SODIUM CHLORIDE, SODIUM LACTATE, POTASSIUM CHLORIDE, CALCIUM CHLORIDE 600; 310; 30; 20 MG/100ML; MG/100ML; MG/100ML; MG/100ML
100 INJECTION, SOLUTION INTRAVENOUS CONTINUOUS
Status: DISCONTINUED | OUTPATIENT
Start: 2024-07-22 | End: 2024-07-22 | Stop reason: HOSPADM

## 2024-07-22 RX ORDER — FENTANYL CITRATE 50 UG/ML
INJECTION, SOLUTION INTRAMUSCULAR; INTRAVENOUS AS NEEDED
Status: DISCONTINUED | OUTPATIENT
Start: 2024-07-22 | End: 2024-07-22

## 2024-07-22 RX ORDER — SODIUM CHLORIDE 9 MG/ML
100 INJECTION, SOLUTION INTRAVENOUS CONTINUOUS
Status: DISCONTINUED | OUTPATIENT
Start: 2024-07-22 | End: 2024-07-22 | Stop reason: HOSPADM

## 2024-07-22 RX ORDER — SODIUM CHLORIDE, SODIUM LACTATE, POTASSIUM CHLORIDE, CALCIUM CHLORIDE 600; 310; 30; 20 MG/100ML; MG/100ML; MG/100ML; MG/100ML
INJECTION, SOLUTION INTRAVENOUS CONTINUOUS PRN
Status: DISCONTINUED | OUTPATIENT
Start: 2024-07-22 | End: 2024-07-22

## 2024-07-22 RX ORDER — ONDANSETRON HYDROCHLORIDE 2 MG/ML
INJECTION, SOLUTION INTRAVENOUS AS NEEDED
Status: DISCONTINUED | OUTPATIENT
Start: 2024-07-22 | End: 2024-07-22

## 2024-07-22 RX ORDER — MOXIFLOXACIN 5 MG/ML
1 SOLUTION/ DROPS OPHTHALMIC
Status: COMPLETED | OUTPATIENT
Start: 2024-07-22 | End: 2024-07-22

## 2024-07-22 RX ORDER — TETRACAINE HYDROCHLORIDE 5 MG/ML
SOLUTION OPHTHALMIC AS NEEDED
Status: DISCONTINUED | OUTPATIENT
Start: 2024-07-22 | End: 2024-07-22 | Stop reason: HOSPADM

## 2024-07-22 RX ORDER — DICLOFENAC SODIUM 1 MG/ML
SOLUTION/ DROPS OPHTHALMIC AS NEEDED
Status: DISCONTINUED | OUTPATIENT
Start: 2024-07-22 | End: 2024-07-22 | Stop reason: HOSPADM

## 2024-07-22 RX ORDER — POVIDONE-IODINE 5 %
SOLUTION, NON-ORAL OPHTHALMIC (EYE) AS NEEDED
Status: DISCONTINUED | OUTPATIENT
Start: 2024-07-22 | End: 2024-07-22 | Stop reason: HOSPADM

## 2024-07-22 RX ORDER — LIDOCAINE IN NACL,ISO-OSMOT/PF 30 MG/3 ML
0.1 SYRINGE (ML) INJECTION ONCE
Status: DISCONTINUED | OUTPATIENT
Start: 2024-07-22 | End: 2024-07-22 | Stop reason: HOSPADM

## 2024-07-22 RX ORDER — OXYCODONE HYDROCHLORIDE 5 MG/1
5 TABLET ORAL EVERY 4 HOURS PRN
Status: DISCONTINUED | OUTPATIENT
Start: 2024-07-22 | End: 2024-07-22 | Stop reason: HOSPADM

## 2024-07-22 RX ORDER — PREDNISOLONE ACETATE 10 MG/ML
SUSPENSION/ DROPS OPHTHALMIC AS NEEDED
Status: DISCONTINUED | OUTPATIENT
Start: 2024-07-22 | End: 2024-07-22 | Stop reason: HOSPADM

## 2024-07-22 RX ORDER — LIDOCAINE HYDROCHLORIDE 20 MG/ML
INJECTION, SOLUTION INFILTRATION; PERINEURAL AS NEEDED
Status: DISCONTINUED | OUTPATIENT
Start: 2024-07-22 | End: 2024-07-22

## 2024-07-22 RX ORDER — MIDAZOLAM HYDROCHLORIDE 1 MG/ML
INJECTION, SOLUTION INTRAMUSCULAR; INTRAVENOUS AS NEEDED
Status: DISCONTINUED | OUTPATIENT
Start: 2024-07-22 | End: 2024-07-22

## 2024-07-22 RX ORDER — ONDANSETRON HYDROCHLORIDE 2 MG/ML
4 INJECTION, SOLUTION INTRAVENOUS ONCE AS NEEDED
Status: DISCONTINUED | OUTPATIENT
Start: 2024-07-22 | End: 2024-07-22 | Stop reason: HOSPADM

## 2024-07-22 RX ORDER — ACETAMINOPHEN 325 MG/1
TABLET ORAL AS NEEDED
Status: DISCONTINUED | OUTPATIENT
Start: 2024-07-22 | End: 2024-07-22

## 2024-07-22 RX ORDER — ALBUTEROL SULFATE 0.83 MG/ML
2.5 SOLUTION RESPIRATORY (INHALATION) ONCE AS NEEDED
Status: DISCONTINUED | OUTPATIENT
Start: 2024-07-22 | End: 2024-07-22 | Stop reason: HOSPADM

## 2024-07-22 RX ORDER — PROPOFOL 10 MG/ML
INJECTION, EMULSION INTRAVENOUS AS NEEDED
Status: DISCONTINUED | OUTPATIENT
Start: 2024-07-22 | End: 2024-07-22

## 2024-07-22 ASSESSMENT — ENCOUNTER SYMPTOMS
CARDIOVASCULAR NEGATIVE: 1
RESPIRATORY NEGATIVE: 1
CONSTITUTIONAL NEGATIVE: 1

## 2024-07-22 ASSESSMENT — PAIN - FUNCTIONAL ASSESSMENT
PAIN_FUNCTIONAL_ASSESSMENT: 0-10

## 2024-07-22 ASSESSMENT — PAIN SCALES - GENERAL
PAINLEVEL_OUTOF10: 0 - NO PAIN
PAINLEVEL_OUTOF10: 0 - NO PAIN
PAIN_LEVEL: 0
PAINLEVEL_OUTOF10: 0 - NO PAIN

## 2024-07-22 ASSESSMENT — COLUMBIA-SUICIDE SEVERITY RATING SCALE - C-SSRS
6. HAVE YOU EVER DONE ANYTHING, STARTED TO DO ANYTHING, OR PREPARED TO DO ANYTHING TO END YOUR LIFE?: NO
1. IN THE PAST MONTH, HAVE YOU WISHED YOU WERE DEAD OR WISHED YOU COULD GO TO SLEEP AND NOT WAKE UP?: NO
2. HAVE YOU ACTUALLY HAD ANY THOUGHTS OF KILLING YOURSELF?: NO

## 2024-07-22 NOTE — ANESTHESIA POSTPROCEDURE EVALUATION
Patient: Yogi Lynn III    Procedure Summary       Date: 07/22/24 Room / Location: [facility] OR 03 / [facility]    Anesthesia Start: 1433 Anesthesia Stop: 1558    Procedure: Insertion Shunt Eye with retrobulbar block, OS (Left) Diagnosis:       Uveitic glaucoma of left eye, severe stage      (Uveitic glaucoma of left eye, severe stage [H40.42X3, H20.9])    Surgeons: Janny Barton MD Responsible Provider: Reza Vincent MD    Anesthesia Type: general ASA Status: 2            Anesthesia Type: general    Vitals Value Taken Time   /86 07/22/24 1547   Temp 36.2 °C (97.2 °F) 07/22/24 1547   Pulse 64 07/22/24 1547   Resp 16 07/22/24 1547   SpO2 96 % 07/22/24 1547       Anesthesia Post Evaluation    Patient location during evaluation: bedside  Patient participation: complete - patient participated  Level of consciousness: awake and awake and alert  Pain score: 0  Pain management: adequate  Airway patency: patent  Cardiovascular status: acceptable  Respiratory status: acceptable  Hydration status: acceptable  Postoperative Nausea and Vomiting: none        No notable events documented.

## 2024-07-22 NOTE — OP NOTE
(L) Operative Note     Date: 2024  OR Location: [facility]    Name: Yogi Lynn III, : 1985, Age: 39 y.o., MRN: [id number], Sex: male    Diagnosis  Pre-op Diagnosis      * Uveitic glaucoma of left eye, severe stage [H40.42X3, H20.9] Post-op Diagnosis     * Uveitic glaucoma of left eye, severe stage [H40.42X3, H20.9]     Procedures  60799 removal of implanted material, left  Insertion Shunt Eye with retrobulbar block, OS  11608 - OK AQUEOUS SHUNT EXTRAOC EQUAT PLATE RSVR W/GRAFT  , LEFT    Surgeons      * Janny Barton - Primary    Resident/Fellow/Other Assistant:  Stephani    Procedure Summary  Anesthesia: General  ASA: II  Anesthesia Staff:   Anesthesiologist: Reza Vincent MD  CRNA: JORDYN Cristobal-CRNA  Estimated Blood Loss: none        Specimen: No specimens collected     Staff:   Circulator: Mariann Short RN; Michelle Martin RN  Scrub Person: Kelsy Romero          Findings: as expected    Indications: Yogi Lynn III is an 39 y.o. male who is having surgery for Uveitic glaucoma of left eye, severe stage [H40.42X3, H20.9]. LEFT    The patient was seen in the preoperative area. The risks, benefits, complications, treatment options, non-operative alternatives, expected recovery and outcomes were discussed with the patient. The possibilities of reaction to medication, pulmonary aspiration, injury to surrounding structures, bleeding, recurrent infection, the need for additional procedures, failure to diagnose a condition, and creating a complication or operation were discussed with the patient. The patient concurred with the proposed plan, giving informed consent.  The site of surgery was properly noted/marked if necessary per policy.     Procedure Details:   The patient was escorted to the operating room where a time out was completed and general anesthesia was induced. The patient was prepped and draped in the   usual sterile fashion for intraocular surgery. A lid speculum  was placed and   the operating microscope was positioned.       A 7-0 vicryl traction suture was placed in the superior cornea and the eye   rotated inferiorly. A complete superior 180 degree peritomy was then done. Careful dissection in the conj/tenon plane was done back to the previous plate. The capsule was then entered and fibrous down grow to the plate were then severed and the plate was removed. Cautery was done to achieve hemostasis. The previous capsular material was dissected away. Blunt dissection to expand the supratemporal pocket was preformed.  The clear path 250 was introduced and the tube was ligated with 7-0 vicryl and confirmed to be occluded by attempting to irrigate BSS through the tube. 4 fenestrations were made in the tube. The plate was tucked into the supratemporal pocket 8 mm posterior to the limbus and then fixed to the sclera with 9-0 nylon, The tube was measured and cut bevel up.  A 23-gauge   needle was used to create a scleral tunnel and then enter the anterior chamber   superotemporally. Provisc was injected into the anterior chamber. The tube was   inserted into the anterior chamber through the needle track and secured with a   7-0 vicryl cruciate suture to the superotemporal sclera. Tutoplast was measured   and cut to cover the external portion of the tube and secured with 7-0 vicryl   sutures at each anterior corner. The conjunctival peritomy was closed with a 7-0 vicryl interupted sutures. . The eye had a normal   palpable pressure. The traction suture was removed. The lid speculum was   removed. Vigamox, prednisolone, and tobradex ointment were instilled and the   eye was covered with an eye patch and shield. The patient tolerated the   procedure well.   Complications:  None   Disposition: stable          Attending Attestation: I was present and scrubbed for the entire procedure    Janny Barton  Phone Number: [phone]

## 2024-07-22 NOTE — ANESTHESIA PREPROCEDURE EVALUATION
Patient: Yogi Lynn III    Procedure Information       Date/Time: 07/22/24 1420    Procedure: Insertion Shunt Eye with retrobulbar block, OS (Left) - with retrobulbar block    Location: [facility] OR 03 / [facility]    Surgeons: Janny Barton MD          Vitals:    07/22/24 1314   BP: (!) 163/98   Pulse: 54   Resp: 16   Temp: 36 °C (96.8 °F)   SpO2: 98%       Past Surgical History:   Procedure Laterality Date   • IRIDOTOMY / IRIDECTOMY       Past Medical History:   Diagnosis Date   • Body mass index (BMI)30.0-30.9, adult 06/13/2022    BMI 30.0-30.9,adult   • Glaucoma    • Hidradenitis suppurativa 05/20/2022    Hidradenitis suppurativa   • Personal history of diseases of the skin and subcutaneous tissue 09/10/2021    History of hidradenitis suppurativa       Current Facility-Administered Medications:   •  moxifloxacin (Vigamox) 0.5 % ophthalmic solution 1 drop, 1 drop, Left Eye, q5 min, Janny Barton MD  •  sodium chloride 0.9% infusion, 100 mL/hr, intravenous, Continuous, Janny Barton MD  Prior to Admission medications    Medication Sig Start Date End Date Taking? Authorizing Provider   brimonidine (Alphagan P) 0.1 % ophthalmic solution Administer 1 drop into both eyes 2 times a day. 6/6/24 6/6/25 Yes Janny Barton MD   dorzolamide-timoloL (Cosopt) 22.3-6.8 mg/mL ophthalmic solution Administer 1 drop into both eyes 2 times a day. 6/6/24 6/6/25 Yes Janny Barton MD   Vyzulta 0.024 % drops Administer 1 drop into the right eye once daily at bedtime. 6/6/24 6/6/25 Yes Janny Barton MD   acetaZOLAMIDE (Diamox) 500 mg 12 hr capsule Take 2 capsules (1,000 mg) by mouth 2 times a day. 7/12/24 8/11/24  Janny Barton MD   camphor-menthoL (Sarna) lotion Apply topically if needed for itching.    Historical Provider, MD   clindamycin (Cleocin T) 1 % lotion Apply topically 2 times a day.    Historical Provider, MD   folic acid (Folvite) 1 mg tablet Take 1 tablet (1 mg) by mouth once daily. 5/13/24    Tammi Fernandes MD   ketoconazole (NIZOral) 2 % shampoo Apply topically.    Historical Provider, MD   methotrexate (Trexall) 2.5 mg tablet Take 6 tablets (15 mg total) by mouth 1 (one) time per week.  Follow directions carefully, and ask to explain any part you do not understand. Take exactly as directed. 7/14/24   Bruno Lindsey MD   moxifloxacin (Vigamox) 0.5 % ophthalmic solution Administer 1 drop into the left eye 3 times a day. 12/4/23   Janny Barton MD   mupirocin (Bactroban) 2 % ointment 1 Application. 9/15/22   Historical Provider, MD   prednisoLONE acetate (Pred-Forte) 1 % ophthalmic suspension Administer 1 drop into the left eye 4 times a day. 12/4/23   Janny Barton MD   prednisoLONE acetate (Pred-Forte) 1 % ophthalmic suspension Administer 1 drop into both eyes every other day. 6/6/24 8/5/24  Janny Barton MD   sulfamethoxazole-trimethoprim (Bactrim) 400-80 mg tablet Take 2 tablets by mouth 2 times a day.    Historical Provider, MD   tacrolimus (Protopic) 0.1 % ointment 1 Application. 9/21/22   Historical Provider, MD   tadalafil (Cialis) 5 mg tablet Take 1 tablet (5 mg) by mouth once daily.    Historical Provider, MD     No Known Allergies  Social History     Tobacco Use   • Smoking status: Never     Passive exposure: Past   • Smokeless tobacco: Never   Substance Use Topics   • Alcohol use: Yes     Comment: occ         Chemistry    Lab Results   Component Value Date/Time     05/13/2024 1108    K 4.0 05/13/2024 1108     (H) 05/13/2024 1108    CO2 22 05/13/2024 1108    BUN 15 05/13/2024 1108    CREATININE 0.86 05/13/2024 1108    Lab Results   Component Value Date/Time    CALCIUM 9.1 05/13/2024 1108    ALKPHOS 86 05/13/2024 1108    AST 17 05/13/2024 1108    ALT 21 05/13/2024 1108    BILITOT 0.4 05/13/2024 1108          Lab Results   Component Value Date/Time    WBC 4.0 (L) 05/13/2024 1108    HGB 13.5 05/13/2024 1108    HCT 38.1 (L) 05/13/2024 1108     05/13/2024 1108     No  "results found for: \"PROTIME\", \"PTT\", \"INR\"  No results found for this or any previous visit (from the past 4464 hour(s)).  No results found for this or any previous visit from the past 1095 days.      Relevant Problems   Anesthesia (within normal limits)  States he sometimes wakes up agitated and confused, although he can't remember when this was or what anesthetic it may have been.       Cardiac   (+) Hypertriglyceridemia      Pulmonary (within normal limits)      Neuro   (+) Anxiety   (+) Episodic tension-type headache, not intractable      GI (within normal limits)      /Renal (within normal limits)      Liver (within normal limits)      Endocrine (within normal limits)      Hematology (within normal limits)      Musculoskeletal (within normal limits)      HEENT   (+) Primary open angle glaucoma of both eyes, indeterminate stage   (+) Uveitic glaucoma of left eye, severe stage   (+) Uveitic glaucoma, left, severe stage      ID   (+) Hidradenitis suppurativa   (+) Impetigo, unspecified   (+) Onychomycosis of toenail   (+) Trichomonas infection      Skin (within normal limits)      GYN (within normal limits)       Clinical information reviewed:   Tobacco  Allergies  Meds   Med Hx  Surg Hx   Fam Hx  Soc Hx        NPO Detail:  NPO/Void Status  Date of Last Liquid: 07/22/24  Time of Last Liquid: 1100 (water of bottle @ 1100.  Total of 2 bottles water today: Dr. Vincent made aware)  Date of Last Solid: 07/22/24  Time of Last Solid: 0000         Physical Exam    Airway  Mallampati: II  TM distance: >3 FB  Neck ROM: full     Cardiovascular - normal exam  Rhythm: regular  Rate: normal     Dental - normal exam     Pulmonary - normal exam     Abdominal - normal exam           Anesthesia Plan    History of general anesthesia?: yes  History of complications of general anesthesia?: no    ASA 2     general     intravenous induction   Anesthetic plan and risks discussed with patient.  Use of blood products discussed " with patient who.    Plan discussed with CRNA and attending.

## 2024-07-22 NOTE — DISCHARGE INSTRUCTIONS
After Cataract Surgery Instructions       May have Tylenol after: 7:00PM    May have Ibuprofen/advil/motrin/aleve after cleared by MD       When you are discharged from the surgery center, your eye will be covered with a protective   shield. You have an appointment to see your doctor at the _______________ office on ___________.     At night, wear the protective shield over the operated eye for one week after surgery. The doctor will advise you when it is safe to go to bed without the protective shield.   Avoid heavy lifting (nothing heavier than a gallon of milk), bending, pushing, straining and sporting activities until the doctor gives you permission. Avoid any activity in which you might bump or injure your eye. You are allowed to read, watch television, sew and play cards or any other activity that involves using your eyes. Do not rub your eye.     You may use Tylenol after surgery for any discomfort you may experience. A slight aching feeling is normal after surgery.   You can continue your home medications as prescribed.       Day After Surgery:     No eye drops day of surgery. Keep eye shield on until follow up appointment.     If you experience a sudden decrease or loss of vision, increased pain not relieved by Tylenol, increased redness or swelling to eyelids, or fever higher than 100 degrees, call Dr. Barton immediately at [phone] or [phone].   The doctor can be reached 24 hours a day through these phone numbers.   After Hours: [phone], ask for eye doctor on call    Avoid driving until you can properly  distances. This may take from one day to two weeks. Check with your doctor.     As your eye is healing, you may notice a fluctuation in your vision for up to 4-6 weeks following your surgery. When your eye has completely healed, the doctor will prescribe a new lens for your glasses if necessary.

## 2024-07-22 NOTE — H&P
History Of Present Illness  Yogi Lynn III is a 39 y.o. male presenting with Left eye mixed mechanism glaucoma. Here for left eye tube shunt removal and 2ndary tube shunt placement.     Past Medical History  Past Medical History:   Diagnosis Date    Body mass index (BMI)30.0-30.9, adult 06/13/2022    BMI 30.0-30.9,adult    Hidradenitis suppurativa 05/20/2022    Hidradenitis suppurativa    Personal history of diseases of the skin and subcutaneous tissue 09/10/2021    History of hidradenitis suppurativa       Surgical History  Past Surgical History:   Procedure Laterality Date    IRIDOTOMY / IRIDECTOMY          Social History  He reports that he has never smoked. He has been exposed to tobacco smoke. He has never used smokeless tobacco. He reports current alcohol use. He reports current drug use. Drug: Marijuana.    Family History  Family History   Problem Relation Name Age of Onset    Diabetes Mother          Allergies  Patient has no known allergies.    Review of Systems   Constitutional: Negative.    Respiratory: Negative.     Cardiovascular: Negative.         Physical Exam     Last Recorded Vitals  There were no vitals taken for this visit.    Relevant Results             Assessment/Plan   Principal Problem:    Uveitic glaucoma of left eye, severe stage      Yogi Lynn III is a 39 y.o. male presenting with Left eye mixed mechanism glaucoma. Here for left eye tube shunt placement.           Esperanza Styles MD

## 2024-07-23 ENCOUNTER — APPOINTMENT (OUTPATIENT)
Dept: OPHTHALMOLOGY | Facility: CLINIC | Age: 39
End: 2024-07-23
Payer: COMMERCIAL

## 2024-07-23 DIAGNOSIS — H21.02 HYPHEMA OF LEFT EYE: Primary | ICD-10-CM

## 2024-07-23 DIAGNOSIS — H40.42X3 UVEITIC GLAUCOMA, LEFT, SEVERE STAGE: ICD-10-CM

## 2024-07-23 DIAGNOSIS — H20.9 UVEITIC GLAUCOMA, LEFT, SEVERE STAGE: ICD-10-CM

## 2024-07-23 PROCEDURE — 76512 OPH US DX B-SCAN: CPT | Mod: LEFT SIDE | Performed by: OPHTHALMOLOGY

## 2024-07-23 PROCEDURE — 99024 POSTOP FOLLOW-UP VISIT: CPT | Performed by: OPHTHALMOLOGY

## 2024-07-23 RX ORDER — PREDNISOLONE ACETATE 10 MG/ML
1 SUSPENSION/ DROPS OPHTHALMIC 4 TIMES DAILY
Qty: 10 ML | Refills: 2 | Status: SHIPPED | OUTPATIENT
Start: 2024-07-23 | End: 2024-08-06

## 2024-07-23 RX ORDER — ATROPINE SULFATE 10 MG/ML
1 SOLUTION/ DROPS OPHTHALMIC 2 TIMES DAILY
Qty: 5 ML | Refills: 1 | Status: SHIPPED | OUTPATIENT
Start: 2024-07-23 | End: 2024-08-22

## 2024-07-23 RX ORDER — MOXIFLOXACIN 5 MG/ML
1 SOLUTION/ DROPS OPHTHALMIC 4 TIMES DAILY
Qty: 5 ML | Refills: 0 | Status: SHIPPED | OUTPATIENT
Start: 2024-07-23 | End: 2024-07-30

## 2024-07-23 ASSESSMENT — TONOMETRY
IOP_METHOD: GOLDMANN APPLANATION
OS_IOP_MMHG: 28

## 2024-07-23 ASSESSMENT — EXTERNAL EXAM - LEFT EYE: OS_EXAM: NORMAL

## 2024-07-23 ASSESSMENT — PACHYMETRY
OS_CT(UM): 597
OD_CT(UM): 588

## 2024-07-23 ASSESSMENT — VISUAL ACUITY: METHOD: SNELLEN - LINEAR

## 2024-07-23 ASSESSMENT — EXTERNAL EXAM - RIGHT EYE: OD_EXAM: NORMAL

## 2024-07-23 ASSESSMENT — ENCOUNTER SYMPTOMS: EYES NEGATIVE: 1

## 2024-07-23 ASSESSMENT — SLIT LAMP EXAM - LIDS: COMMENTS: NORMAL

## 2024-07-23 NOTE — PROGRESS NOTES
POD1 s/p tube shunt exchange, left  Marked bleeding, unclear why, no marked bleeding noted at surgery  Developed upper eyelid hematoma. Will resolve with time. Can use ice pack. Sleep with head elevated.  Continue pred/moxi QID  Start atropine BID  shield and activity restrictions.  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 Sanon  s/p [facility] FP7 with STK ou  S/p ce/iol/abic/partial gatt/ab-interno tube revision, left eye  field 5/25/23: WNL OD, severe defect OS now with only central island  Iop remains too high OS, patient has not been taking diamox or drops as instructed  Likely risk of blindness again emphasized  Long discussion, we have limited options for OS going forward and visual prognosis is grim  Recommend proceeding with clear path tube exchange first he was agreeable and then refused due to travel plans. I have disclosed to him that he is high risk for blindness without intervention and that I need to do as soon as possible as I will be on leave October - January. He states he will discuss with his girlfriend and let us know his decision.     continue cosopt BID OU, ,  continue vyzulta OU  continue alphagan bid OU  Increase diamox 1000 mg BID, pt told to stay hydrated, eat a banana a day   Compliance stressed       anterior uveitis, both eyes:  Cme, left eye  Remains improved but not resolved OS  Continu pred qod  continue f/u with Dr. Sanon,importance of f/u stressed!    cataract, right eye  monitor

## 2024-07-26 ENCOUNTER — OFFICE VISIT (OUTPATIENT)
Dept: OPHTHALMOLOGY | Facility: CLINIC | Age: 39
End: 2024-07-26
Payer: COMMERCIAL

## 2024-07-26 DIAGNOSIS — H42 GLAUCOMA OF RIGHT EYE ASSOCIATED WITH UNDERLYING DISEASE: ICD-10-CM

## 2024-07-26 DIAGNOSIS — H25.11 AGE-RELATED NUCLEAR CATARACT OF RIGHT EYE: ICD-10-CM

## 2024-07-26 DIAGNOSIS — H21.02 HYPHEMA OF LEFT EYE: Primary | ICD-10-CM

## 2024-07-26 ASSESSMENT — TONOMETRY
OD_IOP_MMHG: 23
IOP_METHOD: GOLDMANN APPLANATION
OS_IOP_MMHG: 18

## 2024-07-26 ASSESSMENT — SLIT LAMP EXAM - LIDS: COMMENTS: NORMAL

## 2024-07-26 ASSESSMENT — PACHYMETRY
OD_CT(UM): 588
OS_CT(UM): 597

## 2024-07-26 ASSESSMENT — VISUAL ACUITY
METHOD: SNELLEN - LINEAR
OS_SC: LP

## 2024-07-26 ASSESSMENT — EXTERNAL EXAM - RIGHT EYE: OD_EXAM: NORMAL

## 2024-07-26 ASSESSMENT — EXTERNAL EXAM - LEFT EYE: OS_EXAM: NORMAL

## 2024-07-26 NOTE — PROGRESS NOTES
POD4 s/p tube shunt exchange, left  Marked bleeding, unclear why, no marked bleeding noted at surgery  Developed upper eyelid hematoma, this is now resolved.   Still with total hyphema but starting to clear around area of tube, IOP is holding will monitor closely, if still present next week will rediscuss anterior chamber (AC) wash out  Sleep with head elevated.  Continue pred/moxi QID  Continue atropine BID  shield and activity restrictions. 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 Sanon  s/p [facility] FP7 with STK ou  S/p ce/iol/abic/partial gatt/ab-interno tube revision, left eye  field 5/25/23: WNL OD, severe defect OS now with only central island  Iop remains too high OS, patient has not been taking diamox or drops as instructed  Likely risk of blindness again emphasized  Long discussion, we have limited options for OS going forward and visual prognosis is grim  Recommend proceeding with clear path tube exchange first he was agreeable and then refused due to travel plans. I have disclosed to him that he is high risk for blindness without intervention and that I need to do as soon as possible as I will be on leave October - January. He states he will discuss with his girlfriend and let us know his decision.     continue cosopt BID OU, ,  continue vyzulta OU  continue alphagan bid OU  Increase diamox 1000 mg BID, pt told to stay hydrated, eat a banana a day   Compliance stressed       anterior uveitis, both eyes:  Cme, left eye  Remains improved but not resolved OS  Continu pred qod  continue  f/u with Dr. Sanon,importance of f/u stressed!    cataract, right eye  monitor

## 2024-07-29 ENCOUNTER — TELEPHONE (OUTPATIENT)
Dept: OPHTHALMOLOGY | Facility: HOSPITAL | Age: 39
End: 2024-07-29
Payer: COMMERCIAL

## 2024-07-29 ENCOUNTER — HOSPITAL ENCOUNTER (EMERGENCY)
Facility: HOSPITAL | Age: 39
Discharge: HOME | End: 2024-07-29
Attending: EMERGENCY MEDICINE
Payer: COMMERCIAL

## 2024-07-29 ENCOUNTER — DOCUMENTATION (OUTPATIENT)
Dept: OPHTHALMOLOGY | Facility: CLINIC | Age: 39
End: 2024-07-29
Payer: COMMERCIAL

## 2024-07-29 VITALS
HEART RATE: 62 BPM | SYSTOLIC BLOOD PRESSURE: 158 MMHG | HEIGHT: 73 IN | WEIGHT: 220 LBS | TEMPERATURE: 97.1 F | OXYGEN SATURATION: 97 % | RESPIRATION RATE: 16 BRPM | DIASTOLIC BLOOD PRESSURE: 90 MMHG | BODY MASS INDEX: 29.16 KG/M2

## 2024-07-29 DIAGNOSIS — H40.052 INTRAOCULAR PRESSURE INCREASE, LEFT: ICD-10-CM

## 2024-07-29 DIAGNOSIS — H21.02 HYPHEMA OF LEFT EYE: ICD-10-CM

## 2024-07-29 DIAGNOSIS — H57.12 LEFT EYE PAIN: Primary | ICD-10-CM

## 2024-07-29 DIAGNOSIS — H40.42X3 UVEITIC GLAUCOMA, LEFT, SEVERE STAGE: ICD-10-CM

## 2024-07-29 DIAGNOSIS — H40.1134 PRIMARY OPEN ANGLE GLAUCOMA OF BOTH EYES, INDETERMINATE STAGE: ICD-10-CM

## 2024-07-29 DIAGNOSIS — H20.9 UVEITIC GLAUCOMA, LEFT, SEVERE STAGE: ICD-10-CM

## 2024-07-29 LAB
ALBUMIN SERPL BCP-MCNC: 4.5 G/DL (ref 3.4–5)
ALP SERPL-CCNC: 86 U/L (ref 33–120)
ALT SERPL W P-5'-P-CCNC: 11 U/L (ref 10–52)
ANION GAP SERPL CALC-SCNC: 15 MMOL/L (ref 10–20)
AST SERPL W P-5'-P-CCNC: 11 U/L (ref 9–39)
BASOPHILS # BLD AUTO: 0.01 X10*3/UL (ref 0–0.1)
BASOPHILS NFR BLD AUTO: 0.1 %
BILIRUB SERPL-MCNC: 0.8 MG/DL (ref 0–1.2)
BUN SERPL-MCNC: 8 MG/DL (ref 6–23)
CALCIUM SERPL-MCNC: 9.6 MG/DL (ref 8.6–10.6)
CHLORIDE SERPL-SCNC: 102 MMOL/L (ref 98–107)
CO2 SERPL-SCNC: 26 MMOL/L (ref 21–32)
CREAT SERPL-MCNC: 0.58 MG/DL (ref 0.5–1.3)
EGFRCR SERPLBLD CKD-EPI 2021: >90 ML/MIN/1.73M*2
EOSINOPHIL # BLD AUTO: 0.01 X10*3/UL (ref 0–0.7)
EOSINOPHIL NFR BLD AUTO: 0.1 %
ERYTHROCYTE [DISTWIDTH] IN BLOOD BY AUTOMATED COUNT: 12.5 % (ref 11.5–14.5)
FLUAV RNA RESP QL NAA+PROBE: NOT DETECTED
FLUBV RNA RESP QL NAA+PROBE: NOT DETECTED
GLUCOSE SERPL-MCNC: 117 MG/DL (ref 74–99)
HCT VFR BLD AUTO: 43.5 % (ref 41–52)
HGB BLD-MCNC: 14.7 G/DL (ref 13.5–17.5)
IMM GRANULOCYTES # BLD AUTO: 0.02 X10*3/UL (ref 0–0.7)
IMM GRANULOCYTES NFR BLD AUTO: 0.2 % (ref 0–0.9)
LYMPHOCYTES # BLD AUTO: 0.88 X10*3/UL (ref 1.2–4.8)
LYMPHOCYTES NFR BLD AUTO: 9.7 %
MCH RBC QN AUTO: 29.2 PG (ref 26–34)
MCHC RBC AUTO-ENTMCNC: 33.8 G/DL (ref 32–36)
MCV RBC AUTO: 86 FL (ref 80–100)
MONOCYTES # BLD AUTO: 0.44 X10*3/UL (ref 0.1–1)
MONOCYTES NFR BLD AUTO: 4.9 %
NEUTROPHILS # BLD AUTO: 7.69 X10*3/UL (ref 1.2–7.7)
NEUTROPHILS NFR BLD AUTO: 85 %
NRBC BLD-RTO: 0 /100 WBCS (ref 0–0)
PLATELET # BLD AUTO: 269 X10*3/UL (ref 150–450)
POTASSIUM SERPL-SCNC: 3.8 MMOL/L (ref 3.5–5.3)
PROT SERPL-MCNC: 6.8 G/DL (ref 6.4–8.2)
RBC # BLD AUTO: 5.04 X10*6/UL (ref 4.5–5.9)
SARS-COV-2 RNA RESP QL NAA+PROBE: NOT DETECTED
SODIUM SERPL-SCNC: 139 MMOL/L (ref 136–145)
WBC # BLD AUTO: 9.1 X10*3/UL (ref 4.4–11.3)

## 2024-07-29 PROCEDURE — 96376 TX/PRO/DX INJ SAME DRUG ADON: CPT

## 2024-07-29 PROCEDURE — 99285 EMERGENCY DEPT VISIT HI MDM: CPT

## 2024-07-29 PROCEDURE — 87636 SARSCOV2 & INF A&B AMP PRB: CPT

## 2024-07-29 PROCEDURE — 85025 COMPLETE CBC W/AUTO DIFF WBC: CPT

## 2024-07-29 PROCEDURE — 99284 EMERGENCY DEPT VISIT MOD MDM: CPT | Mod: 25

## 2024-07-29 PROCEDURE — 36415 COLL VENOUS BLD VENIPUNCTURE: CPT

## 2024-07-29 PROCEDURE — 84075 ASSAY ALKALINE PHOSPHATASE: CPT

## 2024-07-29 PROCEDURE — 2500000004 HC RX 250 GENERAL PHARMACY W/ HCPCS (ALT 636 FOR OP/ED): Mod: SE

## 2024-07-29 PROCEDURE — 96374 THER/PROPH/DIAG INJ IV PUSH: CPT

## 2024-07-29 RX ORDER — ACETAZOLAMIDE 500 MG/1
1000 CAPSULE, EXTENDED RELEASE ORAL 2 TIMES DAILY
Qty: 120 CAPSULE | Refills: 0 | Status: SHIPPED | OUTPATIENT
Start: 2024-07-29 | End: 2024-08-28

## 2024-07-29 RX ORDER — DORZOLAMIDE HYDROCHLORIDE AND TIMOLOL MALEATE 20; 5 MG/ML; MG/ML
1 SOLUTION/ DROPS OPHTHALMIC 2 TIMES DAILY
Qty: 30 ML | Refills: 0 | Status: SHIPPED | OUTPATIENT
Start: 2024-07-29 | End: 2024-08-28

## 2024-07-29 RX ORDER — ACETAZOLAMIDE 500 MG/5ML
500 INJECTION, POWDER, LYOPHILIZED, FOR SOLUTION INTRAVENOUS ONCE
Status: COMPLETED | OUTPATIENT
Start: 2024-07-29 | End: 2024-07-29

## 2024-07-29 RX ORDER — ERYTHROMYCIN 5 MG/G
OINTMENT OPHTHALMIC 2 TIMES DAILY
Qty: 7 G | Refills: 0 | Status: SHIPPED | OUTPATIENT
Start: 2024-07-29 | End: 2024-08-28

## 2024-07-29 RX ORDER — PREDNISOLONE ACETATE 10 MG/ML
1 SUSPENSION/ DROPS OPHTHALMIC 4 TIMES DAILY
Qty: 30 ML | Refills: 0 | Status: SHIPPED | OUTPATIENT
Start: 2024-07-29 | End: 2024-08-28

## 2024-07-29 RX ORDER — ACETAZOLAMIDE 250 MG/1
1000 TABLET ORAL ONCE
Status: DISCONTINUED | OUTPATIENT
Start: 2024-07-29 | End: 2024-07-29

## 2024-07-29 RX ORDER — ATROPINE SULFATE 10 MG/ML
1 SOLUTION/ DROPS OPHTHALMIC 2 TIMES DAILY
Qty: 5 ML | Refills: 0 | Status: SHIPPED | OUTPATIENT
Start: 2024-07-29 | End: 2024-08-28

## 2024-07-29 RX ORDER — ACETAZOLAMIDE 500 MG/5ML
1000 INJECTION, POWDER, LYOPHILIZED, FOR SOLUTION INTRAVENOUS ONCE
Status: COMPLETED | OUTPATIENT
Start: 2024-07-29 | End: 2024-07-29

## 2024-07-29 RX ORDER — MOXIFLOXACIN 5 MG/ML
1 SOLUTION/ DROPS OPHTHALMIC 4 TIMES DAILY
Qty: 9 ML | Refills: 0 | Status: SHIPPED | OUTPATIENT
Start: 2024-07-29 | End: 2024-08-05 | Stop reason: SDUPTHER

## 2024-07-29 RX ORDER — LATANOPROSTENE BUNOD 0.24 MG/ML
1 SOLUTION/ DROPS OPHTHALMIC NIGHTLY
Qty: 7.5 ML | Refills: 0 | Status: SHIPPED | OUTPATIENT
Start: 2024-07-29 | End: 2024-08-28

## 2024-07-29 RX ORDER — BRIMONIDINE TARTRATE 1 MG/ML
1 SOLUTION/ DROPS OPHTHALMIC 2 TIMES DAILY
Qty: 30 ML | Refills: 0 | Status: SHIPPED | OUTPATIENT
Start: 2024-07-29 | End: 2024-08-28

## 2024-07-29 ASSESSMENT — PAIN DESCRIPTION - PROGRESSION: CLINICAL_PROGRESSION: NOT CHANGED

## 2024-07-29 ASSESSMENT — COLUMBIA-SUICIDE SEVERITY RATING SCALE - C-SSRS
2. HAVE YOU ACTUALLY HAD ANY THOUGHTS OF KILLING YOURSELF?: NO
6. HAVE YOU EVER DONE ANYTHING, STARTED TO DO ANYTHING, OR PREPARED TO DO ANYTHING TO END YOUR LIFE?: NO
1. IN THE PAST MONTH, HAVE YOU WISHED YOU WERE DEAD OR WISHED YOU COULD GO TO SLEEP AND NOT WAKE UP?: NO

## 2024-07-29 ASSESSMENT — PAIN DESCRIPTION - PAIN TYPE: TYPE: ACUTE PAIN

## 2024-07-29 ASSESSMENT — PAIN - FUNCTIONAL ASSESSMENT: PAIN_FUNCTIONAL_ASSESSMENT: 0-10

## 2024-07-29 ASSESSMENT — PAIN DESCRIPTION - ORIENTATION: ORIENTATION: LEFT

## 2024-07-29 ASSESSMENT — PAIN DESCRIPTION - ONSET: ONSET: ONGOING

## 2024-07-29 ASSESSMENT — PAIN SCALES - GENERAL: PAINLEVEL_OUTOF10: 5 - MODERATE PAIN

## 2024-07-29 ASSESSMENT — PAIN DESCRIPTION - FREQUENCY: FREQUENCY: CONSTANT/CONTINUOUS

## 2024-07-29 ASSESSMENT — PAIN DESCRIPTION - LOCATION: LOCATION: EYE

## 2024-07-29 ASSESSMENT — PAIN DESCRIPTION - DESCRIPTORS: DESCRIPTORS: ACHING

## 2024-07-29 NOTE — CONSULTS
"Reason For Consult    History Of Present Illness  Yogi Lynn III is a 39 y.o. male with a PMH of mixed mechanism glaucoma OU s/p POD7 OS tube shunt exchange. Patient was seen by Dr. Barton on 7/26 on POD4 and notes a total hyphema in the left eye. IOP at that visit was 18.    Patient presented to the ED today after experiencing fevers, chills, and worsening eye pain starting at 7PM last night. He states that the eye pain started as a headache above his left eye, but later in the day states that the pain felt like it was \"in his pupil\". This pain is worse in direct light and feels that he needs to use his eye shield to protect his eye from the light. At baseline, he is LP in his left eye, but does not think his vision has changed at all. Endorses some increased tearing in the last day, but denies any crusting or abnormal discharge in the eye. He has not been taking 1000mg diamox. Otherwise has been taking is postop drops as prescribed.     Denies any pain with eye movements in either eye or itchiness. He feels the overall redness in the eye has improved since the surgery. He denies any flashes or floaters in right eye.      Past Medical History  He has a past medical history of Body mass index (BMI)30.0-30.9, adult (06/13/2022), Glaucoma, Hidradenitis suppurativa (05/20/2022), and Personal history of diseases of the skin and subcutaneous tissue (09/10/2021).    Surgical History  He has a past surgical history that includes Iridotomy / iridectomy.    Social History  He reports that he has never smoked. He has been exposed to tobacco smoke. He has never used smokeless tobacco. He reports current alcohol use. He reports current drug use. Drug: Marijuana.    Family History  Family History   Problem Relation Name Age of Onset    Diabetes Mother          Allergies  Patient has no known allergies.    Review of Systems  Full ocular ROS above     Physical Exam  Base Eye Exam       Visual Acuity (Snellen - Linear)         " "Right Left    Dist sc  LP    Near cc 20/20               Tonometry (Tonopen, 1:50 PM)         Right Left    Pressure 25 43              Tonometry #2 (Goldmann Applanation, 3:35 PM)         Right Left    Pressure  46              Tonometry #3 (Goldmann Applanation, 5:15PM)         Right Left    Pressure  35              Tonometry Comments    OS IOP 46 after drops and diamox             Pupils         Dark Light    Right 4 2    Left                Visual Fields         Left Right     Full    LP OD             Extraocular Movement         Right Left     Full Full              Neuro/Psych       Oriented x3: Yes    Mood/Affect: Normal                  Slit Lamp and Fundus Exam       External Exam         Right Left    External Normal Normal              Slit Lamp Exam         Right Left    Lids/Lashes Normal 1+ soft edema    Conjunctiva/Sclera 1+injection, shunt covered 360 bullous sub conj heme, shunt covered    Cornea Clear, pigment 4+ PEE    Anterior Chamber Deep and quiet, tube in good position total hyphema slight clearing around area of tube    Iris Round and reactive no view    Lens nsc no view    Anterior Vitreous Normal no view              Fundus Exam         Right Left    Disc Normal No view    C/D Ratio 0.5     Macula pigmentary changes, possible hole     Vessels candle wax dripping     Periphery ischemic periphery                      Last Recorded Vitals  Blood pressure 158/90, pulse 62, temperature 36.2 °C (97.1 °F), temperature source Temporal, resp. rate 16, height 1.854 m (6' 1\"), weight 99.8 kg (220 lb), SpO2 97%.    Relevant Results       Assessment/Plan     Yogi Lynn is a 39 year old male with past ocular hx of mixed mechanism glaucoma OU s/p tube shunt OU, recently s/p OS  clear tube exchange POD7 as well as hx of anterior uveitis of both eyes presenting for left-sided headache, eye pain since yesterday.     #Ocular hypertension, left eye  #Hyphema, left eye   #s/p tube shunt revision POD7, left " eye  #Dry eye, left eye   -Patient had a tube shunt revision surgery with Dr. Barton 7/22 with a post op course most recently complicated by hyphema, previously with IOP of 18 on 7/26. Developed worsening pain and photophobia on 7/28.   -Exam today with BCVA OD 20/20 and OS LP. IOP in OS initially 43 with no improvement after cosopt/ brim/latan 3x q10min and 1g IV diamox. Initial repeat IOP by applanation OS 46. 500mg IV diamox given and 3x cosopt/brimonidine/latanoprost repeated with improvement in IOP by applanation to 35.   - Slit lamp exam OS notable for 1+ soft edema of LILIBETH and LLL, total hyphema with partial clearing near region of tube, 360 bullous subconj heme, and 3-4+ PEE. Slit lamp exam OD notable for   - Dilated fundus exam OS unable due to total hyphema. B-scan completed with no vitritis or retina detachment. DFE OD with possible lamellar macular hole, and periphery stable.   - Overall, suspect this patient's left-sided headache and eye pain are related to elevated intraocular pressure and dry eye vs medicamentosa of the left eye. The elevated IOP is likely due to the hyphema causing impaired drainage through the tube. The patient also has not been using his glaucoma drops and diamox as prescribed in the left eye. The IOP and pain did improve with the cosopt/brimonidine/latanoprost and diamox.     Recommendations:   - Continue cosopt BID, both eyes  - Continue brimonidine BID, both eyes  - Continue vyzulta qhs, both eyes  - Continue 1000mg diamox PO BID; pt advised to stay well hydrated and consume banana daily   - Continue prednisolone qid, left eye  - Continue atropine bid, left eye  - Continue moxifloxacin qid, left eye  - Start preservative free artificial tears qid, left eye  - Start erythro luis bid left eye    #Possible lamellar macular hole right eye  - Exam somewhat limited by ED slit lamp and patient cooperation, however patient appears to have lamellar macular hole OD, but still with 20/20  vision OD.   - Would recommend outpatient OCT macula to further characterize macular changes   - Needs outpatient follow up with retina specialist Dr. Sanon after resolution of acute issues above       Recommend follow-up with  [facility], within 3 days. Please call [phone] (EYES) to make appointment.    Patient discussed with Dr. Barton, glaucoma specialist.     Marjan Francis MD  Ophthalmology, PGY3    Ophthalmology Adult Pager - [phone]  Ophthalmology Pediatrics Pager (M-F 8:00am-5:00pm) - [phone]    For adult follow-up appointments, call: [phone]  For pediatric follow-up appointments, call: [phone]

## 2024-07-29 NOTE — ED PROVIDER NOTES
"HPI   Chief Complaint   Patient presents with    Eye Problem       HPI  Yogi lara a is a 39-year-old male with history of left eye glaucoma s/p shunt insertion with retrobulbar block 7/22/2024 presented to the ED with left eye pain that began last night.  Patient states the pain began on the left forehead yesterday but throughout the day the pain was in his left eye.  Patient denies pain with eye movement.  Patient states he has also been feeling \"hot and cold\" since last night.  Patient states he also feels \"weird\" when he feels hot and cold however cannot explain what this feeling is.  Patient states he had left eye glaucoma surgery on July 22.  Patient states his left eyelids have been swollen since surgery and the swelling today is improved from the initial swelling.  Patient states he has not been able to see out of the left eye since surgery and he only can see light out of the left eye.  Patient states his left eye has been red since surgery as well.  Patient denies discharge from the left eye.  Patient states he has been following up with the ophthalmology team since surgery and is taking his postop medications as prescribed.  Patient states he has not been around anyone has been sick recently.  Patient itchiness to the eyes, denies chest pain, shortness of breath, abdominal pain, nausea, vomiting, body aches, cough, dysuria.    Patient History   Past Medical History:   Diagnosis Date    Body mass index (BMI)30.0-30.9, adult 06/13/2022    BMI 30.0-30.9,adult    Glaucoma     Hidradenitis suppurativa 05/20/2022    Hidradenitis suppurativa    Personal history of diseases of the skin and subcutaneous tissue 09/10/2021    History of hidradenitis suppurativa     Past Surgical History:   Procedure Laterality Date    IRIDOTOMY / IRIDECTOMY       Family History   Problem Relation Name Age of Onset    Diabetes Mother       Social History     Tobacco Use    Smoking status: Never     Passive exposure: Past    " Smokeless tobacco: Never   Vaping Use    Vaping status: Never Used   Substance Use Topics    Alcohol use: Yes     Comment: occ    Drug use: Yes     Types: Marijuana       Physical Exam   ED Triage Vitals [07/29/24 1104]   Temperature Heart Rate Respirations BP   36.2 °C (97.1 °F) 62 16 158/90      Pulse Ox Temp Source Heart Rate Source Patient Position   97 % Temporal -- Sitting      BP Location FiO2 (%)     Left arm --       Physical Exam  Vitals and nursing note reviewed.   Constitutional:       Appearance: Normal appearance.   HENT:      Head: Normocephalic and atraumatic.   Eyes:      General:         Right eye: No discharge.         Left eye: Discharge (watery discharge) present.     Extraocular Movements: Extraocular movements intact.      Right eye: Normal extraocular motion and no nystagmus.      Left eye: Normal extraocular motion.      Conjunctiva/sclera:      Right eye: Right conjunctiva is not injected. No exudate.     Left eye: Left conjunctiva is not injected. Hemorrhage present. No exudate.     Comments: Left upper and lower eyelids are swollen and nontender.  There is a hyphema to the left eye.   Cardiovascular:      Rate and Rhythm: Normal rate and regular rhythm.      Heart sounds: Normal heart sounds. No murmur heard.     No gallop.   Pulmonary:      Effort: Pulmonary effort is normal. No respiratory distress.      Breath sounds: Normal breath sounds. No stridor. No wheezing, rhonchi or rales.   Abdominal:      General: Abdomen is flat. Bowel sounds are normal. There is no distension.      Palpations: Abdomen is soft. There is no mass.      Tenderness: There is no abdominal tenderness. There is no guarding or rebound.      Hernia: No hernia is present.   Skin:     General: Skin is warm and dry.   Neurological:      General: No focal deficit present.      Mental Status: He is alert and oriented to person, place, and time.   Psychiatric:         Mood and Affect: Mood normal.         Behavior:  "Behavior normal.           ED Course & MDM   Diagnoses as of 07/30/24 1110   Left eye pain   Hyphema of left eye   Intraocular pressure increase, left                       [place] Coma Scale Score: 15                        Medical Decision Making  This is a 39-year-old male with history of left eye glaucoma s/p shunt insertion with retrobulbar block on July 22 presenting with left eye pain that began last night.  Patient states is left upper and lower eyelids have been swollen since surgery and swelling today is improved from the initial swelling.  Patient states his left eye has also been red since surgery and the hemorrhage to the conjunctival the left eye today is not new.  Patient states he is only able to see light and during exam patient cannot see my hand or anything in the room.  On exam the patient's left upper and lower eyelids are swollen but nontender to palpation.  The left eye remains closed during exam due to the swelling.  When the left eyelids are open there is hemorrhage to the conjunctiva and hyphema of the left eye.  Patient unable to follow finger to test EOM of the left eye since patient is only able to see light from the left eye.  EOM were test by verbal communication and EOM are intact bilaterally without pain.  Patient states he has been feeling \"hot and cold\" since last night and feeling \"weird\" when he feels hot and cold.  Patient's temperature today was afebrile today in the ED with temperature of 97.1 °F.  Swabs for COVID and influenza were obtained today to rule out causes of patient's symptoms.  COVID and influenza tests were negative.  CBC was obtained to evaluate white blood cell count level.  CBC was unremarkable and shows no elevation in white blood cell count.  CMP was also obtained and shows elevated glucose of 117.  Low concern for orbital cellulitis given patient's EOM are not painful.  Patient's \"hot and cold\" symptoms could be due to a viral infection.  Ophthalmology was " consulted for further evaluation of patient's left eye pain and physical exam findings since the patient did have surgery last week.  There is concern for endophthalmitis with the patient's recent surgery.  Ophthalmology saw the patient in the ED today and requested 1000 mg Diamox stat from the patient's intraocular pressure in the left eye was elevated.  When ophthalmology reevaluated the patient after receiving the initial dose of Diamox the patient's left intraocular pressures were still elevated therefore the patient required an additional dose of 500 mg Diamox.  Ophthalmology also stated the patient has a total hyphema in the left eye.  Ophthalmology recommended the patient continue the medications he was prescribed after surgery which consists of dorzolamide-timolol twice daily both eyes, brimonidine twice daily both eyes, vyzulta every 8 both eyes, 1000 mg Diamox twice daily, prednisolone 4 times daily left eye, atropine twice daily left eye, moxifloxacin 4 times daily left eye.  Ophthalmology also recommends starting artificial tears 4 times daily left eye and erythromycin ointment twice daily the left eye.  Ophthalmology stated the patient will need to follow-up with the ophthalmology clinic within 3 days.  Patient states he does need refills of his postop medications.  Patient has remained hemodynamically stable while in the ED today.    Disposition: Discharge home  Refills for the patient's prescriptions as well as new prescription for erythromycin was sent to the patient's pharmacy.  Patient was advised to use the medications as prescribed.  Patient was advised to buy artificial tears over-the-counter and use 4-6 times per day in the left eye.  Patient was advised to follow-up with the ophthalmology clinic within 3 days.  Patient was advised to call the ophthalmology clinic if the clinic does not reach out to him to schedule the appointment.  Patient was provided with contact information to the  ophthalmology clinic and his discharge paperwork.  Patient was also advised to follow-up with his primary care provider.  Strict return precautions were given.  Plan was discussed with the patient and the patient understands and agrees.  Patient was discharged in stable condition.    Patient was discussed and staffed with Dr. Ro  Procedure  Procedures     Ras Wray PA-C  07/30/24 1148

## 2024-07-29 NOTE — PROGRESS NOTES
Dr. Mcpherson on call consult received a call from Mr. Lynn.     Per Dr. Mcpherson:  He is the patient s/p OS tube shunt exchange 7/22/24. His girlfriend was speaking for him and said that the patient was complaining about headaches over the left forehead as well as sweating and chills. He reported no pain of the eyeball itself and he said he was not concerned about the eye, only headaches. I asked if he had a fever and she said that they did not have a thermometer and that she would get one today. Still with poor vision (last noted LP in your note) and still with redness (last noted to have [address]). I told them that they should come to the ED for evaluation. They expressed understanding. They do also have follow up with you this Thursday but I recommended they come to the ED if things are not improving.    Per Mick: Have patient come see me in [facility] today    I have attempted to call Mr. Lynn leaving three voicemails on is phone to get him to come in today with no return call back.     -Esperanza

## 2024-07-29 NOTE — ED TRIAGE NOTES
Pt had glaucoma surgery on Monday and states he has a little pain above his left eye. Also reports he has been hot and cold.

## 2024-07-29 NOTE — TELEPHONE ENCOUNTER
39yoM s/p OS tube shunt exchange 7/22/24 with Dr. Barton. His girlfriend was speaking for him and said that the patient was complaining about headaches over the left forehead as well as sweating and chills. He reported no pain of the eyeball itself and he said he was not concerned about the eye, only headaches. I asked if he had a fever and she said that they did not have a thermometer and that she would get one today. Still with poor vision (last noted LP) and still with redness (last noted to have [address]). I told them that they should come to the ED for evaluation. They expressed understanding. They do also have follow up with you this Thursday but I recommended they come to the ED if things are not improving.

## 2024-07-29 NOTE — DISCHARGE INSTRUCTIONS
Please follow-up with ophthalmology clinic within 3 days.  Contact information has been included in your discharge paperwork if the ophthalmology clinic does not reach out to you to schedule the appointment.  Refills of your prescriptions have been sent to your pharmacy.  Please use prescriptions as prescribed.  Return to the emergency department if your symptoms persist or worsen.

## 2024-07-31 ENCOUNTER — APPOINTMENT (OUTPATIENT)
Dept: OPHTHALMOLOGY | Facility: CLINIC | Age: 39
End: 2024-07-31
Payer: COMMERCIAL

## 2024-08-01 ENCOUNTER — APPOINTMENT (OUTPATIENT)
Dept: OPHTHALMOLOGY | Facility: CLINIC | Age: 39
End: 2024-08-01
Payer: COMMERCIAL

## 2024-08-01 ENCOUNTER — OFFICE VISIT (OUTPATIENT)
Dept: OPHTHALMOLOGY | Facility: CLINIC | Age: 39
End: 2024-08-01
Payer: COMMERCIAL

## 2024-08-01 DIAGNOSIS — H21.02 HYPHEMA OF LEFT EYE: Primary | ICD-10-CM

## 2024-08-01 DIAGNOSIS — H42 GLAUCOMA OF RIGHT EYE ASSOCIATED WITH UNDERLYING DISEASE: ICD-10-CM

## 2024-08-01 PROCEDURE — 99024 POSTOP FOLLOW-UP VISIT: CPT | Performed by: OPHTHALMOLOGY

## 2024-08-01 RX ORDER — MOXIFLOXACIN 5 MG/ML
1 SOLUTION/ DROPS OPHTHALMIC
OUTPATIENT
Start: 2024-08-01 | End: 2024-08-01

## 2024-08-01 ASSESSMENT — SLIT LAMP EXAM - LIDS
COMMENTS: NORMAL
COMMENTS: NORMAL

## 2024-08-01 ASSESSMENT — VISUAL ACUITY
METHOD: SNELLEN - LINEAR
OS_SC: LP

## 2024-08-01 ASSESSMENT — CUP TO DISC RATIO: OD_RATIO: 0.5

## 2024-08-01 ASSESSMENT — TONOMETRY
IOP_METHOD: GOLDMANN APPLANATION
OD_IOP_MMHG: 18
OS_IOP_MMHG: 31

## 2024-08-01 ASSESSMENT — EXTERNAL EXAM - LEFT EYE: OS_EXAM: NORMAL

## 2024-08-01 ASSESSMENT — PACHYMETRY
OS_CT(UM): 597
OD_CT(UM): 588

## 2024-08-01 ASSESSMENT — EXTERNAL EXAM - RIGHT EYE: OD_EXAM: NORMAL

## 2024-08-01 NOTE — PROGRESS NOTES
POw1 s/p tube shunt exchange, left  Marked bleeding, unclear why, no marked bleeding noted at surgery  Developed upper eyelid hematoma, this is now resolved.   IOP is now up  Recommend proceeding with anterior chamber (AC) wash out  Rba reviewed, will schedule asap  Sleep with head elevated.  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 Sanon  s/p [facility] FP7 with STK ou  S/p ce/iol/abic/partial gatt/ab-interno tube revision, left eye  field 5/25/23: WNL OD, severe defect OS now with only central island  Iop remains too high OS, patient has not been taking diamox or drops as instructed  Likely risk of blindness again emphasized  Long discussion, we have limited options for OS going forward and visual prognosis is grim  Recommend proceeding with clear path tube exchange first he was agreeable and then refused due to travel plans. I have disclosed to him that he is high risk for blindness without intervention and that I need to do as soon as possible as I will be on leave October - January. He states he will discuss with his girlfriend and let us know his decision.     continue cosopt BID OU, ,  continue vyzulta OU  continue alphagan bid OU  Increase diamox 1000 mg BID, pt told to stay hydrated, eat a banana a day   Compliance stressed       anterior uveitis, both eyes:  Cme, left eye  Remains improved but not resolved OS  Continu pred qod  continue f/u with Dr. Sanon,importance of f/u stressed!    cataract, right eye  monitor

## 2024-08-05 DIAGNOSIS — H21.02 HYPHEMA OF LEFT EYE: ICD-10-CM

## 2024-08-05 DIAGNOSIS — H20.9 UVEITIC GLAUCOMA, LEFT, SEVERE STAGE: ICD-10-CM

## 2024-08-05 DIAGNOSIS — H40.42X3 UVEITIC GLAUCOMA, LEFT, SEVERE STAGE: ICD-10-CM

## 2024-08-05 PROCEDURE — RXMED WILLOW AMBULATORY MEDICATION CHARGE

## 2024-08-05 RX ORDER — PREDNISOLONE ACETATE 10 MG/ML
1 SUSPENSION/ DROPS OPHTHALMIC 4 TIMES DAILY
Qty: 5 ML | Refills: 0 | Status: SHIPPED | OUTPATIENT
Start: 2024-08-05

## 2024-08-05 RX ORDER — MOXIFLOXACIN 5 MG/ML
1 SOLUTION/ DROPS OPHTHALMIC 4 TIMES DAILY
Qty: 3 ML | Refills: 0 | Status: SHIPPED | OUTPATIENT
Start: 2024-08-05

## 2024-08-06 ENCOUNTER — ANESTHESIA EVENT (OUTPATIENT)
Dept: OPERATING ROOM | Facility: CLINIC | Age: 39
End: 2024-08-06
Payer: COMMERCIAL

## 2024-08-06 ENCOUNTER — HOSPITAL ENCOUNTER (OUTPATIENT)
Facility: CLINIC | Age: 39
Setting detail: OUTPATIENT SURGERY
Discharge: HOME | End: 2024-08-06
Attending: OPHTHALMOLOGY | Admitting: OPHTHALMOLOGY
Payer: COMMERCIAL

## 2024-08-06 ENCOUNTER — PHARMACY VISIT (OUTPATIENT)
Dept: PHARMACY | Facility: CLINIC | Age: 39
End: 2024-08-06
Payer: MEDICAID

## 2024-08-06 ENCOUNTER — ANESTHESIA (OUTPATIENT)
Dept: OPERATING ROOM | Facility: CLINIC | Age: 39
End: 2024-08-06
Payer: COMMERCIAL

## 2024-08-06 VITALS
OXYGEN SATURATION: 98 % | HEART RATE: 50 BPM | SYSTOLIC BLOOD PRESSURE: 140 MMHG | TEMPERATURE: 97 F | WEIGHT: 209.44 LBS | BODY MASS INDEX: 27.63 KG/M2 | DIASTOLIC BLOOD PRESSURE: 86 MMHG | RESPIRATION RATE: 16 BRPM

## 2024-08-06 DIAGNOSIS — H40.42X3 UVEITIC GLAUCOMA, LEFT, SEVERE STAGE: ICD-10-CM

## 2024-08-06 DIAGNOSIS — H20.9 UVEITIC GLAUCOMA, LEFT, SEVERE STAGE: ICD-10-CM

## 2024-08-06 DIAGNOSIS — H21.02 HYPHEMA OF LEFT EYE: Primary | ICD-10-CM

## 2024-08-06 PROCEDURE — 66184 REVISION OF AQUEOUS SHUNT: CPT | Performed by: OPHTHALMOLOGY

## 2024-08-06 PROCEDURE — 65800 DRAINAGE OF EYE: CPT | Performed by: OPHTHALMOLOGY

## 2024-08-06 PROCEDURE — 3700000001 HC GENERAL ANESTHESIA TIME - INITIAL BASE CHARGE: Performed by: OPHTHALMOLOGY

## 2024-08-06 PROCEDURE — 7100000001 HC RECOVERY ROOM TIME - INITIAL BASE CHARGE: Performed by: OPHTHALMOLOGY

## 2024-08-06 PROCEDURE — A65800: Performed by: ANESTHESIOLOGIST ASSISTANT

## 2024-08-06 PROCEDURE — 7100000009 HC PHASE TWO TIME - INITIAL BASE CHARGE: Performed by: OPHTHALMOLOGY

## 2024-08-06 PROCEDURE — 2500000004 HC RX 250 GENERAL PHARMACY W/ HCPCS (ALT 636 FOR OP/ED): Mod: SE | Performed by: ANESTHESIOLOGIST ASSISTANT

## 2024-08-06 PROCEDURE — 7100000002 HC RECOVERY ROOM TIME - EACH INCREMENTAL 1 MINUTE: Performed by: OPHTHALMOLOGY

## 2024-08-06 PROCEDURE — 7100000010 HC PHASE TWO TIME - EACH INCREMENTAL 1 MINUTE: Performed by: OPHTHALMOLOGY

## 2024-08-06 PROCEDURE — 2500000005 HC RX 250 GENERAL PHARMACY W/O HCPCS: Mod: SE | Performed by: OPHTHALMOLOGY

## 2024-08-06 PROCEDURE — 3600000007 HC OR TIME - EACH INCREMENTAL 1 MINUTE - PROCEDURE LEVEL TWO: Performed by: OPHTHALMOLOGY

## 2024-08-06 PROCEDURE — A65800: Performed by: ANESTHESIOLOGY

## 2024-08-06 PROCEDURE — 3600000002 HC OR TIME - INITIAL BASE CHARGE - PROCEDURE LEVEL TWO: Performed by: OPHTHALMOLOGY

## 2024-08-06 PROCEDURE — 3700000002 HC GENERAL ANESTHESIA TIME - EACH INCREMENTAL 1 MINUTE: Performed by: OPHTHALMOLOGY

## 2024-08-06 PROCEDURE — 2500000001 HC RX 250 WO HCPCS SELF ADMINISTERED DRUGS (ALT 637 FOR MEDICARE OP): Mod: SE | Performed by: OPHTHALMOLOGY

## 2024-08-06 RX ORDER — SODIUM CHLORIDE, SODIUM LACTATE, POTASSIUM CHLORIDE, CALCIUM CHLORIDE 600; 310; 30; 20 MG/100ML; MG/100ML; MG/100ML; MG/100ML
100 INJECTION, SOLUTION INTRAVENOUS CONTINUOUS
Status: CANCELLED | OUTPATIENT
Start: 2024-08-06

## 2024-08-06 RX ORDER — ONDANSETRON HYDROCHLORIDE 2 MG/ML
INJECTION, SOLUTION INTRAVENOUS AS NEEDED
Status: DISCONTINUED | OUTPATIENT
Start: 2024-08-06 | End: 2024-08-06

## 2024-08-06 RX ORDER — LIDOCAINE HYDROCHLORIDE 20 MG/ML
INJECTION, SOLUTION INFILTRATION; PERINEURAL AS NEEDED
Status: DISCONTINUED | OUTPATIENT
Start: 2024-08-06 | End: 2024-08-06 | Stop reason: HOSPADM

## 2024-08-06 RX ORDER — LIDOCAINE HYDROCHLORIDE 10 MG/ML
0.1 INJECTION, SOLUTION EPIDURAL; INFILTRATION; INTRACAUDAL; PERINEURAL ONCE
Status: CANCELLED | OUTPATIENT
Start: 2024-08-06 | End: 2024-08-06

## 2024-08-06 RX ORDER — MIDAZOLAM HYDROCHLORIDE 1 MG/ML
INJECTION, SOLUTION INTRAMUSCULAR; INTRAVENOUS AS NEEDED
Status: DISCONTINUED | OUTPATIENT
Start: 2024-08-06 | End: 2024-08-06

## 2024-08-06 RX ORDER — FENTANYL CITRATE 50 UG/ML
INJECTION, SOLUTION INTRAMUSCULAR; INTRAVENOUS AS NEEDED
Status: DISCONTINUED | OUTPATIENT
Start: 2024-08-06 | End: 2024-08-06

## 2024-08-06 RX ORDER — DICLOFENAC SODIUM 1 MG/ML
SOLUTION/ DROPS OPHTHALMIC AS NEEDED
Status: DISCONTINUED | OUTPATIENT
Start: 2024-08-06 | End: 2024-08-06 | Stop reason: HOSPADM

## 2024-08-06 RX ORDER — ONDANSETRON HYDROCHLORIDE 2 MG/ML
4 INJECTION, SOLUTION INTRAVENOUS ONCE AS NEEDED
Status: CANCELLED | OUTPATIENT
Start: 2024-08-06

## 2024-08-06 RX ORDER — PROPOFOL 10 MG/ML
INJECTION, EMULSION INTRAVENOUS AS NEEDED
Status: DISCONTINUED | OUTPATIENT
Start: 2024-08-06 | End: 2024-08-06

## 2024-08-06 RX ORDER — PREDNISOLONE ACETATE 10 MG/ML
SUSPENSION/ DROPS OPHTHALMIC AS NEEDED
Status: DISCONTINUED | OUTPATIENT
Start: 2024-08-06 | End: 2024-08-06 | Stop reason: HOSPADM

## 2024-08-06 RX ORDER — METOCLOPRAMIDE HYDROCHLORIDE 5 MG/ML
10 INJECTION INTRAMUSCULAR; INTRAVENOUS ONCE AS NEEDED
Status: CANCELLED | OUTPATIENT
Start: 2024-08-06

## 2024-08-06 RX ORDER — MOXIFLOXACIN 5 MG/ML
SOLUTION/ DROPS OPHTHALMIC AS NEEDED
Status: DISCONTINUED | OUTPATIENT
Start: 2024-08-06 | End: 2024-08-06 | Stop reason: HOSPADM

## 2024-08-06 RX ORDER — MOXIFLOXACIN 5 MG/ML
1 SOLUTION/ DROPS OPHTHALMIC
Status: SHIPPED | OUTPATIENT
Start: 2024-08-06 | End: 2024-08-06

## 2024-08-06 RX ORDER — GLYCOPYRROLATE 0.2 MG/ML
INJECTION INTRAMUSCULAR; INTRAVENOUS AS NEEDED
Status: DISCONTINUED | OUTPATIENT
Start: 2024-08-06 | End: 2024-08-06

## 2024-08-06 RX ORDER — ACETAMINOPHEN 325 MG/1
650 TABLET ORAL EVERY 4 HOURS PRN
Status: CANCELLED | OUTPATIENT
Start: 2024-08-06

## 2024-08-06 RX ORDER — HYDROMORPHONE HYDROCHLORIDE 1 MG/ML
0.4 INJECTION, SOLUTION INTRAMUSCULAR; INTRAVENOUS; SUBCUTANEOUS EVERY 5 MIN PRN
Status: CANCELLED | OUTPATIENT
Start: 2024-08-06

## 2024-08-06 RX ORDER — HYDROMORPHONE HYDROCHLORIDE 0.2 MG/ML
0.2 INJECTION INTRAMUSCULAR; INTRAVENOUS; SUBCUTANEOUS EVERY 5 MIN PRN
Status: CANCELLED | OUTPATIENT
Start: 2024-08-06

## 2024-08-06 SDOH — HEALTH STABILITY: MENTAL HEALTH: CURRENT SMOKER: 1

## 2024-08-06 ASSESSMENT — PAIN SCALES - GENERAL
PAIN_LEVEL: 0
PAINLEVEL_OUTOF10: 0 - NO PAIN
PAINLEVEL_OUTOF10: 0 - NO PAIN
PAINLEVEL_OUTOF10: 1
PAINLEVEL_OUTOF10: 0 - NO PAIN
PAINLEVEL_OUTOF10: 0 - NO PAIN

## 2024-08-06 ASSESSMENT — PAIN - FUNCTIONAL ASSESSMENT
PAIN_FUNCTIONAL_ASSESSMENT: 0-10

## 2024-08-06 NOTE — H&P
History Of Present Illness  Yogi Lynn III is a 39 y.o. male presenting with hyphema left eye.     Past Medical History  He has a past medical history of Body mass index (BMI)30.0-30.9, adult (06/13/2022), Glaucoma, Hidradenitis suppurativa (05/20/2022), and Personal history of diseases of the skin and subcutaneous tissue (09/10/2021).    Surgical History  He has a past surgical history that includes Iridotomy / iridectomy.     Social History  He reports that he has never smoked. He has been exposed to tobacco smoke. He has never used smokeless tobacco. He reports current alcohol use. He reports current drug use. Drug: Marijuana.     Allergies  Patient has no known allergies.    ROS  Patient denies ocular pain, redness, discharge, decreased vision, double vision, blind spots, flashes, or floaters.     Physical Exam  Alert and oriented x 3  Regular Radial Pulses, Bilateral  Normal Chest Rise with Respirations       Assessment/Plan   Principal Problem:    Hyphema of left eye      Plan for lysis of adhesions and anterior chamber (AC) washout left eye.            Rob Dodge MD

## 2024-08-06 NOTE — ANESTHESIA PROCEDURE NOTES
Airway  Date/Time: 8/6/2024 12:08 PM  Urgency: elective    Airway not difficult    Staffing  Performed: [facility]   Authorized by: Brenda Brito DO    Performed by: TUYET Stock  Patient location during procedure: OR    Indications and Patient Condition  Indications for airway management: anesthesia  Spontaneous Ventilation: absent  Sedation level: deep  Preoxygenated: yes  Patient position: sniffing  Mask difficulty assessment: 0 - not attempted    Final Airway Details  Final airway type: supraglottic airway      Successful airway: Size 4      Additional Comments  Easy LMA by TUYET

## 2024-08-06 NOTE — ANESTHESIA POSTPROCEDURE EVALUATION
Patient: Yogi Lynn III    Procedure Summary       Date: 08/06/24 [place] [facility] [place] [facility] [place] [facility] [place] [facility] [place]    Anesthesia Start: 1152 Anesthesia Stop: 1255    Procedure: Lysis Adhesions Eye, anterior chamber washout, tube revision (Left) Diagnosis:       Hyphema of left eye      (Hyphema of left eye [H21.02])    Surgeons: Janny Barton MD Responsible Provider: Brenda Brito DO    Anesthesia Type: MAC ASA Status: 2            Anesthesia Type: MAC    Vitals Value Taken Time   /77 08/06/24 1310   Temp 36.1 °C (97 °F) 08/06/24 1251   Pulse 59 08/06/24 1310   Resp 15 08/06/24 1310   SpO2 99 % 08/06/24 1310       Anesthesia Post Evaluation    Patient location during evaluation: PACU  Patient participation: complete - patient participated  Level of consciousness: awake  Pain score: 0  Pain management: adequate  Airway patency: patent  Cardiovascular status: acceptable  Respiratory status: acceptable  Hydration status: acceptable  Postoperative Nausea and Vomiting: none        No notable events documented.

## 2024-08-06 NOTE — ANESTHESIA PREPROCEDURE EVALUATION
Patient: Yogi Lynn III    Procedure Information       Anesthesia Start Date/Time: 08/06/24 1152    Procedure: Lysis Adhesions Eye (Left) - may need propofol bolus    [place] [facility] [place] [facility] [facility] [place] [facility] [place]    Surgeons: Janny Barton MD            Relevant Problems   Anesthesia (within normal limits)      Cardiac   (+) Hypertriglyceridemia      Neuro   (+) Anxiety   (+) Episodic tension-type headache, not intractable      HEENT   (+) Primary open angle glaucoma of both eyes, indeterminate stage   (+) Uveitic glaucoma of left eye, severe stage   (+) Uveitic glaucoma, left, severe stage      ID   (+) Hidradenitis suppurativa   (+) Impetigo, unspecified   (+) Onychomycosis of toenail   (+) Trichomonas infection       Clinical information reviewed:   Tobacco  Allergies  Meds   Med Hx  Surg Hx   Fam Hx  Soc Hx        NPO Detail:  No data recorded     Physical Exam    Airway  Mallampati: II  TM distance: >3 FB  Neck ROM: full     Cardiovascular - normal exam  Rate: normal     Dental - normal exam     Pulmonary - normal exam  Breath sounds clear to auscultation     Abdominal - normal exam             Anesthesia Plan    History of general anesthesia?: yes  History of complications of general anesthesia?: no    ASA 2     MAC     The patient is a current smoker.  Patient was previously instructed to abstain from smoking on day of procedure.  Patient smoked on day of procedure.    intravenous induction   Anesthetic plan and risks discussed with patient.    Plan discussed with [facility].

## 2024-08-06 NOTE — DISCHARGE INSTRUCTIONS
You have an appointment to see your doctor at the  [place] [facility] tomorrow 8/7/24 at 10 am.     At night, wear the protective shield over the operated eye until your visit in clinic tomorrow. Do not place any drops in the operative eye. You may continue drops in the non-operative eye as usual.     Avoid heavy lifting (nothing heavier than a gallon of milk), bending, pushing, straining and sporting activities until the doctor gives you permission. Avoid any activity in which you might bump or injure your eye. You are allowed to read, watch television, sew and play cards or any other activity that involves using your eyes. Do not rub your eye.     You may use Tylenol after surgery for any discomfort you may experience. A slight aching feeling is normal after surgery.       If you experience a sudden decrease or loss of vision, increased pain not relieved by Tylenol, increased redness or swelling to eyelids, or fever higher than 100 degrees, call Dr. Barton immediately at [phone] or [phone].   The doctor can be reached 24 hours a day through these phone numbers.   After Hours: [phone], ask for eye doctor on call

## 2024-08-06 NOTE — OP NOTE
(L) Operative Note     Date: 2024  OR Location: 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 LEFT    The patient was seen in the preoperative area. The risks, benefits, complications, treatment options, non-operative alternatives, expected recovery and outcomes were discussed with the patient. The possibilities of reaction to medication, pulmonary aspiration, injury to surrounding structures, bleeding, recurrent infection, the need for additional procedures, failure to diagnose a condition, and creating a complication or operation were discussed with the patient. The patient concurred with the proposed plan, giving informed consent.  The site of surgery was properly noted/marked if necessary per policy.     Procedure Details:   The patient was escorted to the operating room where a time out was completed   and monitored anesthesia care was begun. The patient became combative and general anesthesia had to be induced. The patient was prepped and draped in   the usual sterile fashion for intraocular surgery. A lid speculum was placed   and the operating  microscope was positioned. A paracentesis was made inferio and superiotemporally. The anterior vitrector was used to irrigate and aspirate blood from the anterior chamber. Provisc was then used to form the anterior chamber. No additional bleeding was noted. Microscissors were used to trim the tube which appeared to be too long in the anterior chamber. The cut portion of the tube was removed from the anterior chamber. The provisc was removed from the anterior chamber (AC) with the anterior vitrector. The wounds were secured with 10-0 vicryl.  The wounds were checked and   found to be watertight. 50% of the anterior chamber (AC) was filled with provisc to prevent post operative bleeding and hypotony.The anterior chamber was at physiologic depth and   pressure. The lid speculum was removed and a patch and shield were taped in   place. The patient tolerated the procedure well, and there were no   complications.   Complications:  None   Disposition: stable          Attending Attestation: I was present and scrubbed for the entire procedure    Janny Barton  Phone Number: [phone]

## 2024-08-07 ENCOUNTER — APPOINTMENT (OUTPATIENT)
Dept: OPHTHALMOLOGY | Facility: CLINIC | Age: 39
End: 2024-08-07
Payer: COMMERCIAL

## 2024-08-07 DIAGNOSIS — H21.02 HYPHEMA OF LEFT EYE: Primary | ICD-10-CM

## 2024-08-07 DIAGNOSIS — H42 GLAUCOMA OF RIGHT EYE ASSOCIATED WITH UNDERLYING DISEASE: ICD-10-CM

## 2024-08-07 DIAGNOSIS — H40.42X3 UVEITIC GLAUCOMA, LEFT, SEVERE STAGE: ICD-10-CM

## 2024-08-07 DIAGNOSIS — H20.9 UVEITIC GLAUCOMA, LEFT, SEVERE STAGE: ICD-10-CM

## 2024-08-07 PROCEDURE — 99024 POSTOP FOLLOW-UP VISIT: CPT | Performed by: OPHTHALMOLOGY

## 2024-08-07 ASSESSMENT — PACHYMETRY
OS_CT(UM): 597
OD_CT(UM): 588

## 2024-08-07 ASSESSMENT — VISUAL ACUITY
METHOD: SNELLEN - LINEAR
OS_SC: LP

## 2024-08-07 ASSESSMENT — EXTERNAL EXAM - LEFT EYE: OS_EXAM: NORMAL

## 2024-08-07 ASSESSMENT — ENCOUNTER SYMPTOMS
CARDIOVASCULAR NEGATIVE: 0
CONSTITUTIONAL NEGATIVE: 0
HEMATOLOGIC/LYMPHATIC NEGATIVE: 0
EYES NEGATIVE: 1
MUSCULOSKELETAL NEGATIVE: 0
PSYCHIATRIC NEGATIVE: 0
RESPIRATORY NEGATIVE: 0
GASTROINTESTINAL NEGATIVE: 0
ENDOCRINE NEGATIVE: 0
ALLERGIC/IMMUNOLOGIC NEGATIVE: 0
NEUROLOGICAL NEGATIVE: 0

## 2024-08-07 ASSESSMENT — TONOMETRY
OS_IOP_MMHG: 34
IOP_METHOD: GOLDMANN APPLANATION

## 2024-08-07 ASSESSMENT — SLIT LAMP EXAM - LIDS
COMMENTS: NORMAL
COMMENTS: NORMAL

## 2024-08-07 ASSESSMENT — EXTERNAL EXAM - RIGHT EYE: OD_EXAM: NORMAL

## 2024-08-07 NOTE — PROGRESS NOTES
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 Sanon  s/p [facility] with STK ou  S/p ce/iol/abic/partial gatt/ab-interno tube revision, left eye  field 5/25/23: WNL OD, severe defect OS now with only central island  Iop remains too high OS, patient has not been taking diamox or drops as instructed  Likely risk of blindness again emphasized  Long discussion, we have limited options for OS going forward and visual prognosis is grim  Recommend proceeding with clear path tube exchange first he was agreeable and then refused due to travel plans. I have disclosed to him that he is high risk for blindness without intervention and that I need to do as soon as possible as I will be on leave October - January. He states he will discuss with his girlfriend and let us know his decision.     continue cosopt BID OU, ,  continue vyzulta OU  continue alphagan bid OU  Increase diamox 1000 mg BID, pt told to stay hydrated, eat a banana a day   Compliance stressed       anterior uveitis, both eyes:  Cme, left eye  Remains improved but not resolved OS  Continu pred qod  continue f/u with Dr. Sanon,importance of f/u stressed!    cataract, right eye  monitor

## 2024-08-15 ENCOUNTER — LAB (OUTPATIENT)
Dept: LAB | Facility: LAB | Age: 39
End: 2024-08-15
Payer: COMMERCIAL

## 2024-08-15 ENCOUNTER — APPOINTMENT (OUTPATIENT)
Dept: PRIMARY CARE | Facility: CLINIC | Age: 39
End: 2024-08-15
Payer: COMMERCIAL

## 2024-08-15 ENCOUNTER — OFFICE VISIT (OUTPATIENT)
Dept: OPHTHALMOLOGY | Facility: CLINIC | Age: 39
End: 2024-08-15
Payer: COMMERCIAL

## 2024-08-15 VITALS
TEMPERATURE: 97.7 F | WEIGHT: 210.9 LBS | SYSTOLIC BLOOD PRESSURE: 143 MMHG | HEART RATE: 61 BPM | BODY MASS INDEX: 27.95 KG/M2 | DIASTOLIC BLOOD PRESSURE: 84 MMHG | OXYGEN SATURATION: 100 % | HEIGHT: 73 IN

## 2024-08-15 DIAGNOSIS — R73.03 PREDIABETES: ICD-10-CM

## 2024-08-15 DIAGNOSIS — H40.42X3 UVEITIC GLAUCOMA, LEFT, SEVERE STAGE: ICD-10-CM

## 2024-08-15 DIAGNOSIS — Z71.1 CONCERN ABOUT STI IN MALE WITHOUT DIAGNOSIS: ICD-10-CM

## 2024-08-15 DIAGNOSIS — Z71.1 CONCERN ABOUT STI IN MALE WITHOUT DIAGNOSIS: Primary | ICD-10-CM

## 2024-08-15 DIAGNOSIS — H42 GLAUCOMA OF RIGHT EYE ASSOCIATED WITH UNDERLYING DISEASE: Primary | ICD-10-CM

## 2024-08-15 DIAGNOSIS — H21.02 HYPHEMA OF LEFT EYE: ICD-10-CM

## 2024-08-15 DIAGNOSIS — E78.6 LOW HDL (UNDER 40): ICD-10-CM

## 2024-08-15 DIAGNOSIS — H20.9 UVEITIC GLAUCOMA, LEFT, SEVERE STAGE: ICD-10-CM

## 2024-08-15 DIAGNOSIS — Z00.00 HEALTH CARE MAINTENANCE: ICD-10-CM

## 2024-08-15 LAB
EST. AVERAGE GLUCOSE BLD GHB EST-MCNC: 117 MG/DL
HBA1C MFR BLD: 5.7 %
HBV SURFACE AG SERPL QL IA: NONREACTIVE
HCV AB SER QL: NONREACTIVE
HIV 1+2 AB+HIV1 P24 AG SERPL QL IA: NONREACTIVE
TREPONEMA PALLIDUM IGG+IGM AB [PRESENCE] IN SERUM OR PLASMA BY IMMUNOASSAY: NONREACTIVE

## 2024-08-15 PROCEDURE — 87661 TRICHOMONAS VAGINALIS AMPLIF: CPT

## 2024-08-15 PROCEDURE — 36415 COLL VENOUS BLD VENIPUNCTURE: CPT

## 2024-08-15 PROCEDURE — 87491 CHLMYD TRACH DNA AMP PROBE: CPT

## 2024-08-15 PROCEDURE — 87591 N.GONORRHOEAE DNA AMP PROB: CPT

## 2024-08-15 PROCEDURE — 86803 HEPATITIS C AB TEST: CPT

## 2024-08-15 PROCEDURE — 87340 HEPATITIS B SURFACE AG IA: CPT

## 2024-08-15 PROCEDURE — 86780 TREPONEMA PALLIDUM: CPT

## 2024-08-15 PROCEDURE — 80061 LIPID PANEL: CPT

## 2024-08-15 PROCEDURE — 99024 POSTOP FOLLOW-UP VISIT: CPT | Performed by: OPHTHALMOLOGY

## 2024-08-15 PROCEDURE — 83036 HEMOGLOBIN GLYCOSYLATED A1C: CPT

## 2024-08-15 PROCEDURE — 87389 HIV-1 AG W/HIV-1&-2 AB AG IA: CPT

## 2024-08-15 ASSESSMENT — ENCOUNTER SYMPTOMS
AGITATION: 0
ARTHRALGIAS: 0
HEMATURIA: 0
ENDOCRINE NEGATIVE: 0
CONSTITUTIONAL NEGATIVE: 0
NEUROLOGICAL NEGATIVE: 0
HEMATOLOGIC/LYMPHATIC NEGATIVE: 0
EYES NEGATIVE: 0
CHILLS: 0
HEADACHES: 0
LOSS OF SENSATION IN FEET: 0
ADENOPATHY: 0
FATIGUE: 0
CARDIOVASCULAR NEGATIVE: 0
SINUS PRESSURE: 0
EYE REDNESS: 0
DEPRESSION: 0
GASTROINTESTINAL NEGATIVE: 0
COUGH: 0
DIAPHORESIS: 0
RESPIRATORY NEGATIVE: 0
VOICE CHANGE: 0
CHEST TIGHTNESS: 0
DYSURIA: 0
FLANK PAIN: 0
PSYCHIATRIC NEGATIVE: 0
EYE ITCHING: 0
CONSTIPATION: 0
ALLERGIC/IMMUNOLOGIC NEGATIVE: 0
MUSCULOSKELETAL NEGATIVE: 0
RHINORRHEA: 0
DIFFICULTY URINATING: 0
FEVER: 0
ABDOMINAL DISTENTION: 0
OCCASIONAL FEELINGS OF UNSTEADINESS: 0
ABDOMINAL PAIN: 0
DIARRHEA: 0
SORE THROAT: 0
DIZZINESS: 0
NUMBNESS: 0
BACK PAIN: 0
SHORTNESS OF BREATH: 0

## 2024-08-15 ASSESSMENT — PAIN SCALES - GENERAL: PAINLEVEL: 0-NO PAIN

## 2024-08-15 ASSESSMENT — PATIENT HEALTH QUESTIONNAIRE - PHQ9
1. LITTLE INTEREST OR PLEASURE IN DOING THINGS: NOT AT ALL
SUM OF ALL RESPONSES TO PHQ9 QUESTIONS 1 AND 2: 0
2. FEELING DOWN, DEPRESSED OR HOPELESS: NOT AT ALL

## 2024-08-15 ASSESSMENT — TONOMETRY
OS_IOP_MMHG: 46
OD_IOP_MMHG: 26
IOP_METHOD: GOLDMANN APPLANATION

## 2024-08-15 ASSESSMENT — PACHYMETRY
OS_CT(UM): 597
OD_CT(UM): 588

## 2024-08-15 ASSESSMENT — COLUMBIA-SUICIDE SEVERITY RATING SCALE - C-SSRS
2. HAVE YOU ACTUALLY HAD ANY THOUGHTS OF KILLING YOURSELF?: NO
1. IN THE PAST MONTH, HAVE YOU WISHED YOU WERE DEAD OR WISHED YOU COULD GO TO SLEEP AND NOT WAKE UP?: NO
6. HAVE YOU EVER DONE ANYTHING, STARTED TO DO ANYTHING, OR PREPARED TO DO ANYTHING TO END YOUR LIFE?: NO

## 2024-08-15 ASSESSMENT — EXTERNAL EXAM - LEFT EYE: OS_EXAM: NORMAL

## 2024-08-15 ASSESSMENT — VISUAL ACUITY
METHOD: SNELLEN - LINEAR
OS_SC: LP

## 2024-08-15 ASSESSMENT — SLIT LAMP EXAM - LIDS
COMMENTS: NORMAL
COMMENTS: NORMAL

## 2024-08-15 ASSESSMENT — EXTERNAL EXAM - RIGHT EYE: OD_EXAM: NORMAL

## 2024-08-15 NOTE — PROGRESS NOTES
Subjective   Patient ID: Yogi Lynn III is a 39 y.o. male with PMH mixed mechanism glaucoma and recently s/p tube shunt Left eye 8/6/24 who presents for Annual Exam.    HPI   # Health Maintenance  - Last prior HM: Last year  - Patient's rating of their own health: Fair  - Dental Care: last prior dental visit  // brushes teeth  // flosses teeth  // denies current tooth pain  - Vision: last prior ophtho visit  in August // corrective devices: N/A  // recent vision: at ophthalmology   - Hearing: denies recent hearing loss   - Diet: Eats fair (eats junk and healthy foods; tries to eat well balanced)   Food Insecurity: Not on File (8/25/2019)    Received from [facility]    Food Insecurity     Food: 0     - Exercise: not able to exercise because of his eyes ; did not  exercise previously     Physical Activity: Not on File (8/25/2019)    Received from [facility]    Physical Activity     - Weight:   Wt Readings from Last 6 Encounters:   08/15/24 95.7 kg (210 lb 14.4 oz)   08/06/24 95 kg (209 lb 7 oz)   07/29/24 99.8 kg (220 lb)   07/22/24 100 kg (220 lb 14.4 oz)   05/13/24 98.9 kg (218 lb)   02/12/24 102 kg (225 lb)     Stable   - Smoking:   Social History     Tobacco Use   Smoking Status Never    Passive exposure: Past   Smokeless Tobacco Never   Smokes marijuana everyday. Not interested in cutting back.   - EtOH: Alcohol Use: Yes, patient drinks alcohol.  Occasionally. None is past 3 weeks.   Alcohol Use: Not on file     - Illicit substances: denied.  - Employment: No  - Living Situation: Lives with girlfriend   - Colon CA: will start at 47 years old // family h/o colon ca? No    MEN  - Prostate CA: 1.3 on 1/12/23  - Concern for STI. Denies drainage, pain, lesions. Currently sexually active with female partner.   - Uses protection.   - Colonoscopy- N/A    Immunization History   Administered Date(s) Administered    Flu vaccine (IIV4), preservative free *Check age/dose* 01/11/2017, 10/10/2017, 10/12/2021    PPD Test  "02/12/2021    Tdap vaccine, age 7 year and older (BOOSTRIX, ADACEL) 07/02/2015        Review of Systems   Constitutional:  Negative for chills, diaphoresis, fatigue and fever.   HENT:  Negative for congestion, rhinorrhea, sinus pressure, sore throat and voice change.    Eyes:  Negative for redness and itching.   Respiratory:  Negative for cough, chest tightness and shortness of breath.    Cardiovascular:  Negative for chest pain.   Gastrointestinal:  Negative for abdominal distention, abdominal pain, constipation and diarrhea.   Endocrine: Negative for cold intolerance and heat intolerance.   Genitourinary:  Negative for difficulty urinating, dysuria, flank pain, hematuria, penile pain, penile swelling and urgency.   Musculoskeletal:  Negative for arthralgias and back pain.   Neurological:  Negative for dizziness, numbness and headaches.   Hematological:  Negative for adenopathy.   Psychiatric/Behavioral:  Negative for agitation and behavioral problems.        Objective   /84 (BP Location: Right arm, Patient Position: Sitting, BP Cuff Size: Adult)   Pulse 61   Temp 36.5 °C (97.7 °F) (Temporal)   Ht 1.854 m (6' 1\")   Wt 95.7 kg (210 lb 14.4 oz)   SpO2 100%   BMI 27.82 kg/m²     Physical Exam  Vitals and nursing note reviewed.   Constitutional:       Appearance: Normal appearance.   HENT:      Head: Normocephalic.      Right Ear: Tympanic membrane normal.      Left Ear: Tympanic membrane normal.      Nose: Nose normal.      Mouth/Throat:      Mouth: Mucous membranes are moist.   Eyes:      Conjunctiva/sclera: Conjunctivae normal.   Cardiovascular:      Rate and Rhythm: Normal rate and regular rhythm.   Pulmonary:      Effort: Pulmonary effort is normal.      Breath sounds: Normal breath sounds.   Abdominal:      General: Bowel sounds are normal.      Palpations: Abdomen is soft.   Musculoskeletal:         General: No swelling. Normal range of motion.      Cervical back: Normal range of motion and neck " supple.   Skin:     General: Skin is warm.   Neurological:      General: No focal deficit present.      Mental Status: He is alert and oriented to person, place, and time. Mental status is at baseline.   Psychiatric:         Mood and Affect: Mood normal.         Behavior: Behavior normal.         Thought Content: Thought content normal.         Judgment: Judgment normal.         Assessment/Plan       Yogi Lynn III is a 39 y.o. male with PMH mixed mechanism glaucoma and recently s/p tube shunt Left eye 8/6/24 who presents for Annual Exam.    # Routine Health Maintenance  - Flu vaccine: recommended annually  - Pneuomvax (PPSV23): N/A  - Prevnar (PCV13): not indicated  - Tdap: Next due 7/2/2025  - HPV (<45): N/A  - Shingrix(50+): not indicated  - Hep C: Ordered today  - HIV: Ordered today   - Syphilis: Ordered today  - Lipid Panel (35M,45F): Ordered today    - DM screening: Ordered today   - HTN screening: Today  - Vitamin D 25-OH: N/A  - Depression: PHQ-2 : 0 today   - Tobacco Cessation: N/A  - Last Dental: recommended follow up  - Last Eye exam: recommended follow up  - Colonoscopy (45-75): N/A  - AAA screening (65-75M): not indicated  - Lung CA screening (55-80): N/A  - Osteoporosis (65+F): not indicated       Diagnoses and all orders for this visit:  Concern about STI in male without diagnosis  -     HIV 1/2 Antigen/Antibody Screen with Reflex to Confirmation; Future  -     Hepatitis C antibody; Future  -     Syphilis Screen with Reflex; Future  -     Trichomonas vaginalis, Amplified; Future  -     Hepatitis B surface antigen; Future  -     C. trachomatis / N. gonorrhoeae, DNA probe; Future  Health care maintenance  -     Lipid panel; Future  Prediabetes  -     Hemoglobin A1c; Future  -     Lipid panel; Future  Low HDL (under 40)  -     Lipid panel; Future   Return to clinic        -     In 1 year for physical and/or sooner if needed     Patient discussed with attending physician, Dr. Hart.     Tiffany  MD Joshua   PGY2, Family Medicine

## 2024-08-15 NOTE — PROGRESS NOTES
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 Sanon  s/p [facility] with STK ou  S/p ce/iol/abic/partial gatt/ab-interno tube revision, left eye  field 5/25/23: WNL OD, severe defect OS now with only central island  Iop remains too high OS, patient has not been taking diamox or drops as instructed  Likely risk of blindness again emphasized  Long discussion, we have limited options for OS going forward and visual prognosis is grim  Recommend proceeding with clear path tube exchange first he was agreeable and then refused due to travel plans. I have disclosed to him that he is high risk for blindness without intervention and that I need to do as soon as possible as I will be on leave October - January. He states he will discuss with his girlfriend and let us know his decision.     continue cosopt BID OU, ,  continue vyzulta OU  continue alphagan bid OU  Increase diamox 1000 mg BID, pt told to stay hydrated, eat a banana a day   Compliance stressed       anterior uveitis, both eyes:  Cme, left eye  Remains improved but not resolved OS  Continu pred qod  continue f/u with Dr. Sanon,importance of f/u stressed!    cataract, right eye  monitor

## 2024-08-15 NOTE — PATIENT INSTRUCTIONS
Thank you for coming in to see us today, Mr. Yogi Lynn III! It was a pleasure managing your health together.    Today in clinic, we discussed Yearly physical .    If we ordered bloodwork today, you can get this done at ANY  location and the results will come to me directly. The [facility] here at [facility] are walk-in (no appointment needed); [facility]'s hours are M-F 6:30a-6p and Sat 8a-12p.    Please call  [phone] to make your appointment.     If you have any questions/concerns, you can always use [employer] to message me directly. Or if you prefer, you can call the office at [phone]; if you leave a message, the office staff should translate this to a message in my inbox.    I'm looking forward to seeing you back in clinic in 1 year and or sooner if needed.    Best,  Dr. Lewis

## 2024-08-16 LAB
C TRACH RRNA SPEC QL NAA+PROBE: NEGATIVE
N GONORRHOEA DNA SPEC QL PROBE+SIG AMP: NEGATIVE
T VAGINALIS RRNA SPEC QL NAA+PROBE: NEGATIVE

## 2024-08-17 LAB
CHOLEST SERPL-MCNC: 121 MG/DL (ref 0–199)
CHOLESTEROL/HDL RATIO: 3.6
HDLC SERPL-MCNC: 33.6 MG/DL
LDLC SERPL CALC-MCNC: 63 MG/DL
NON HDL CHOLESTEROL: 87 MG/DL (ref 0–149)
TRIGL SERPL-MCNC: 124 MG/DL (ref 0–149)
VLDL: 25 MG/DL (ref 0–40)

## 2024-08-19 ENCOUNTER — APPOINTMENT (OUTPATIENT)
Dept: OPHTHALMOLOGY | Facility: CLINIC | Age: 39
End: 2024-08-19
Payer: COMMERCIAL

## 2024-08-19 DIAGNOSIS — H20.9 UVEITIC GLAUCOMA, LEFT, SEVERE STAGE: ICD-10-CM

## 2024-08-19 DIAGNOSIS — H40.42X3 UVEITIC GLAUCOMA, LEFT, SEVERE STAGE: ICD-10-CM

## 2024-08-19 DIAGNOSIS — H42 GLAUCOMA OF RIGHT EYE ASSOCIATED WITH UNDERLYING DISEASE: Primary | ICD-10-CM

## 2024-08-19 PROCEDURE — 99024 POSTOP FOLLOW-UP VISIT: CPT | Performed by: OPHTHALMOLOGY

## 2024-08-19 ASSESSMENT — SLIT LAMP EXAM - LIDS
COMMENTS: NORMAL
COMMENTS: NORMAL

## 2024-08-19 ASSESSMENT — EXTERNAL EXAM - LEFT EYE: OS_EXAM: NORMAL

## 2024-08-19 ASSESSMENT — EXTERNAL EXAM - RIGHT EYE: OD_EXAM: NORMAL

## 2024-08-19 ASSESSMENT — TONOMETRY
IOP_METHOD: GOLDMANN APPLANATION
OS_IOP_MMHG: 30

## 2024-08-19 ASSESSMENT — VISUAL ACUITY
METHOD: SNELLEN - LINEAR
OS_SC: LP

## 2024-08-19 ASSESSMENT — PACHYMETRY
OS_CT(UM): 597
OD_CT(UM): 588

## 2024-08-19 NOTE — PROGRESS NOTES
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 Sanon  s/p [facility]7 with STK ou  S/p ce/iol/abic/partial gatt/ab-interno tube revision, left eye  field 5/25/23: WNL OD, severe defect OS now with only central island  Iop remains too high OS, patient has not been taking diamox or drops as instructed  Likely risk of blindness again emphasized  Long discussion, we have limited options for OS going forward and visual prognosis is grim  Recommend proceeding with clear path tube exchange first he was agreeable and then refused due to travel plans. I have disclosed to him that he is high risk for blindness without intervention and that I need to do as soon as possible as I will be on leave October - January. He states he will discuss with his girlfriend and let us know his decision.     continue cosopt BID OU, ,  continue vyzulta OU  continue alphagan bid OU  Increase diamox 1000 mg BID, pt told to stay hydrated, eat a banana a day   Compliance stressed       anterior uveitis, both eyes:  Cme, left eye  Remains improved but not resolved OS  Continu pred qod  continue f/u with Dr. Sanon,importance of f/u stressed!    cataract, right eye  monitor

## 2024-08-23 NOTE — PROGRESS NOTES
I reviewed the resident/fellow's documentation and discussed the patient with the resident/fellow. I agree with the resident/fellow's medical decision making as documented in the note. I discussed but did not see the patient consistent with the Primary Care Exception.       Sonia Hart MD

## 2024-09-04 ENCOUNTER — APPOINTMENT (OUTPATIENT)
Dept: OPHTHALMOLOGY | Facility: CLINIC | Age: 39
End: 2024-09-04
Payer: COMMERCIAL

## 2024-09-04 DIAGNOSIS — H42 GLAUCOMA OF RIGHT EYE ASSOCIATED WITH UNDERLYING DISEASE: Primary | ICD-10-CM

## 2024-09-04 PROCEDURE — 99024 POSTOP FOLLOW-UP VISIT: CPT | Performed by: OPHTHALMOLOGY

## 2024-09-04 ASSESSMENT — ENCOUNTER SYMPTOMS
NEUROLOGICAL NEGATIVE: 0
ALLERGIC/IMMUNOLOGIC NEGATIVE: 0
GASTROINTESTINAL NEGATIVE: 0
CONSTITUTIONAL NEGATIVE: 0
HEMATOLOGIC/LYMPHATIC NEGATIVE: 0
PSYCHIATRIC NEGATIVE: 0
RESPIRATORY NEGATIVE: 0
EYES NEGATIVE: 0
MUSCULOSKELETAL NEGATIVE: 0
CARDIOVASCULAR NEGATIVE: 0
ENDOCRINE NEGATIVE: 0

## 2024-09-04 ASSESSMENT — EXTERNAL EXAM - LEFT EYE: OS_EXAM: NORMAL

## 2024-09-04 ASSESSMENT — PACHYMETRY
OS_CT(UM): 597
OD_CT(UM): 588

## 2024-09-04 ASSESSMENT — VISUAL ACUITY
METHOD: SNELLEN - LINEAR
OS_SC: HM

## 2024-09-04 ASSESSMENT — SLIT LAMP EXAM - LIDS
COMMENTS: NORMAL
COMMENTS: NORMAL

## 2024-09-04 ASSESSMENT — TONOMETRY
IOP_METHOD: GOLDMANN APPLANATION
OS_IOP_MMHG: 15
OD_IOP_MMHG: 19

## 2024-09-04 ASSESSMENT — EXTERNAL EXAM - RIGHT EYE: OD_EXAM: NORMAL

## 2024-09-04 NOTE — PROGRESS NOTES
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 Sanon  s/p [facility]7 with STK ou  S/p ce/iol/abic/partial gatt/ab-interno tube revision, left eye  field 5/25/23: WNL OD, severe defect OS now with only central island  Iop remains too high OS, patient has not been taking diamox or drops as instructed  Likely risk of blindness again emphasized  Long discussion, we have limited options for OS going forward and visual prognosis is grim  Recommend proceeding with clear path tube exchange first he was agreeable and then refused due to travel plans. I have disclosed to him that he is high risk for blindness without intervention and that I need to do as soon as possible as I will be on leave October - January. He states he will discuss with his girlfriend and let us know his decision.     continue cosopt BID OU, ,  continue vyzulta OU  continue alphagan bid OU  Increase diamox 1000 mg BID, pt told to stay hydrated, eat a banana a day   Compliance stressed       anterior uveitis, both eyes:  Cme, left eye  Remains improved but not resolved OS  Continu pred qod  continue f/u with Dr. Sanon,importance of f/u stressed!    cataract, right eye  monitor

## 2024-09-05 ENCOUNTER — APPOINTMENT (OUTPATIENT)
Dept: OPHTHALMOLOGY | Facility: CLINIC | Age: 39
End: 2024-09-05
Payer: COMMERCIAL

## 2024-09-15 DIAGNOSIS — H40.1134 PRIMARY OPEN ANGLE GLAUCOMA OF BOTH EYES, INDETERMINATE STAGE: ICD-10-CM

## 2024-09-16 RX ORDER — LATANOPROSTENE BUNOD 0.24 MG/ML
SOLUTION/ DROPS OPHTHALMIC
Qty: 2.5 ML | Refills: 6 | Status: SHIPPED | OUTPATIENT
Start: 2024-09-16

## 2024-09-16 NOTE — PROGRESS NOTES
[facility]  Outpatient Clinic Note    Patient Name:  Yogi Lynn III  MRN:  [id number]  :  1985  Date of Service: 2024     Visit type: Follow up visit    HPI    Interval History:  Yogi Lynn III is a 39 y.o. male who is being seen today for  problems listed below.     Problem list/Chief complaints:  ED - Tadalafil 5 mg daily    3/22/23: Visit with Dr. Walton presenting for ED  Last visit 23:  -ED panel  -trial of Cialis 5mg daily.   -discussed cessation in detail  -PCP referral  #Erectile Dysfunction   -taking Cialis 5 mg daily --> overall happy with effect   -s/p prostatectomy: no   -Hernia Repair? no  -on nitrates/NTG?: no   -smoker, marijuana 4-5 blunts/day    -partner; no conflicts or issues with partner    -Tried PDE5is? No   -Tried penile injections: no   - Libido/Desire: strong   - been on Testosterone /anabolic steroids? no   -Pain or curvature in penis when erect: none   -Premature or delayed ejaculation: none     24: Patient presents for follow up. He denies any urinary issues. He's been taking Cialis 5 mg a day and it's working for him with no side effects. Patient has no family history of prostate cancer, he does not smoke tobacco but smokes marijuana.    VIRGILIO: 15  IPSS: 2  QOL: 1    Past Medical History:   Diagnosis Date    Body mass index (BMI)30.0-30.9, adult 2022    BMI 30.0-30.9,adult    Glaucoma     Hidradenitis suppurativa 2022    Hidradenitis suppurativa    Personal history of diseases of the skin and subcutaneous tissue 09/10/2021    History of hidradenitis suppurativa       Past Surgical History:   Procedure Laterality Date    IRIDOTOMY / IRIDECTOMY         Social History     Socioeconomic History    Marital status: Single     Spouse name: Not on file    Number of children: Not on file    Years of education: Not on file    Highest education level: Not on file   Occupational History    Not on file   Tobacco Use    Smoking status:  Never     Passive exposure: Past    Smokeless tobacco: Never   Vaping Use    Vaping status: Never Used   Substance and Sexual Activity    Alcohol use: Yes     Comment: occ    Drug use: Yes     Types: Marijuana    Sexual activity: Defer   Other Topics Concern    Not on file   Social History Narrative    Not on file     Social Determinants of Health     Financial Resource Strain: Not on File (8/25/2019)    Received from [facility]    Financial Resource Strain     Financial Resource Strain: 0   Food Insecurity: Not on File (8/25/2019)    Received from [facility]    Food Insecurity     Food: 0   Transportation Needs: Not on File (8/25/2019)    Received from [facility]    Transportation Needs     Transportation: 0   Physical Activity: Not on File (8/25/2019)    Received from [facility]    Physical Activity   Stress: Not on File (8/25/2019)    Received from [facility]    Stress     Stress: 0   Social Connections: Not on File (8/25/2019)    Received from [facility]    Social Connections   Intimate Partner Violence: Not on file   Housing Stability: Not on File (8/25/2019)    Received from [facility]    Housing Stability       No Known Allergies       Current Outpatient Medications:     acetaZOLAMIDE (Diamox) 500 mg 12 hr capsule, Take 2 capsules (1,000 mg) by mouth 2 times a day., Disp: 120 capsule, Rfl: 0    brimonidine (Alphagan P) 0.1 % ophthalmic solution, Administer 1 drop into both eyes 2 times a day., Disp: 30 mL, Rfl: 3    camphor-menthoL (Sarna) lotion, Apply topically if needed for itching., Disp: , Rfl:     clindamycin (Cleocin T) 1 % lotion, Apply topically 2 times a day., Disp: , Rfl:     folic acid (Folvite) 1 mg tablet, Take 1 tablet (1 mg) by mouth once daily., Disp: 90 tablet, Rfl: 1    ketoconazole (NIZOral) 2 % shampoo, Apply topically., Disp: , Rfl:     methotrexate (Trexall) 2.5 mg tablet, Take 6 tablets (15 mg total) by mouth 1 (one) time per week.  Follow directions carefully, and ask to explain any part you  do not understand. Take exactly as directed., Disp: 60 tablet, Rfl: 1    moxifloxacin (Vigamox) 0.5 % ophthalmic solution, Administer 1 drop into the left eye 4 times a day., Disp: 3 mL, Rfl: 0    mupirocin (Bactroban) 2 % ointment, 1 Application., Disp: , Rfl:     prednisoLONE acetate (Pred-Forte) 1 % ophthalmic suspension, Administer 1 drop into the left eye 4 times a day., Disp: 5 mL, Rfl: 0    sulfamethoxazole-trimethoprim (Bactrim) 400-80 mg tablet, Take 2 tablets by mouth 2 times a day., Disp: , Rfl:     tacrolimus (Protopic) 0.1 % ointment, 1 Application., Disp: , Rfl:     tadalafil (Cialis) 5 mg tablet, Take 1 tablet (5 mg) by mouth once daily., Disp: 90 tablet, Rfl: 3    Vyzulta 0.024 % drops, SCHEDULE 1 DROP LEFT EYE NIGHTLY, Disp: 2.5 mL, Rfl: 6     Review of system:  All other systems have been reviewed and are negative for complaints      Last recorded vitals:  There were no vitals taken for this visit.    Physical Exam:  General: Appears comfortable and in no apparent distress.  Head: Normocephalic, atraumatic  Eyes: Non-injected conjunctiva, sclera clear, no proptosis  Lungs: Breathing is easy, non-labored. Speaking in clear and complete sentences. Normal diaphragmatic movement.  Cardiovascular: no peripheral edema, cyanosis or pallor.   Abdomen: soft, non-distended, non-tender  : Bladder: non tender, not distended  MSK: Ambulatory with steady gait, unassisted  Skin: No visible rashes or lesions  Neurologic: Alert, oriented to person, place, and time  Psychiatric: mood and affect appropriate    Labs  Lab on 08/15/2024   Component Date Value    Hepatitis C AB 08/15/2024 Nonreactive     Syphilis Total Ab 08/15/2024 Nonreactive     Hepatitis B Surface AG 08/15/2024 Nonreactive     Hemoglobin A1C 08/15/2024 5.7 (H)     Estimated Average Glucose 08/15/2024 117     Cholesterol 08/15/2024 121     HDL-Cholesterol 08/15/2024 33.6     Cholesterol/HDL Ratio 08/15/2024 3.6     LDL Calculated 08/15/2024 63      VLDL 08/15/2024 25     Triglycerides 08/15/2024 124     Non HDL Cholesterol 08/15/2024 87        Assessment and Plan:  Yogi Lynn III is a 39 y.o. male with history of ED who presents for follow up.     Plan:  -Patient denies any urinary issues, ED is well managed with Cialis  -Refill for Cialis 5 mg daily sent to pharmacy. Reviewed common SE  -Follow-up in 6 months, or sooner if needed, to reassess symptoms and for medication refill.    All questions and concerns were addressed. Patient verbalizes understanding and has no other questions at this time.     Some elements copied from Dr. Walton's note on 3/22/23, the elements have been updated and all reflect current decision making from today, 09/17/24    E&M visit today is associated with current or anticipated ongoing medical care services related to a patient's single, serious condition or a complex condition.    JORDYN Kearney-CNP   [facility] [place]  09/17/24 2:58 PM

## 2024-09-17 ENCOUNTER — OFFICE VISIT (OUTPATIENT)
Dept: UROLOGY | Facility: HOSPITAL | Age: 39
End: 2024-09-17
Payer: COMMERCIAL

## 2024-09-17 DIAGNOSIS — N52.9 ERECTILE DYSFUNCTION, UNSPECIFIED ERECTILE DYSFUNCTION TYPE: Primary | ICD-10-CM

## 2024-09-17 PROCEDURE — 1036F TOBACCO NON-USER: CPT | Performed by: NURSE PRACTITIONER

## 2024-09-17 PROCEDURE — 99214 OFFICE O/P EST MOD 30 MIN: CPT | Performed by: NURSE PRACTITIONER

## 2024-09-17 RX ORDER — TADALAFIL 5 MG/1
5 TABLET ORAL DAILY
Qty: 90 TABLET | Refills: 3 | Status: SHIPPED | OUTPATIENT
Start: 2024-09-17 | End: 2025-09-17

## 2024-09-17 ASSESSMENT — PAIN SCALES - GENERAL: PAINLEVEL: 0-NO PAIN

## 2024-09-19 ENCOUNTER — APPOINTMENT (OUTPATIENT)
Dept: OPHTHALMOLOGY | Facility: CLINIC | Age: 39
End: 2024-09-19
Payer: COMMERCIAL

## 2024-09-19 DIAGNOSIS — H21.02 HYPHEMA OF LEFT EYE: Primary | ICD-10-CM

## 2024-09-19 DIAGNOSIS — H40.1134 PRIMARY OPEN ANGLE GLAUCOMA OF BOTH EYES, INDETERMINATE STAGE: ICD-10-CM

## 2024-09-19 PROCEDURE — 99024 POSTOP FOLLOW-UP VISIT: CPT | Performed by: OPHTHALMOLOGY

## 2024-09-19 ASSESSMENT — ENCOUNTER SYMPTOMS
RESPIRATORY NEGATIVE: 0
PSYCHIATRIC NEGATIVE: 0
EYES NEGATIVE: 0
NEUROLOGICAL NEGATIVE: 0
CONSTITUTIONAL NEGATIVE: 0
CARDIOVASCULAR NEGATIVE: 0
MUSCULOSKELETAL NEGATIVE: 0
GASTROINTESTINAL NEGATIVE: 0
ALLERGIC/IMMUNOLOGIC NEGATIVE: 0
HEMATOLOGIC/LYMPHATIC NEGATIVE: 0
ENDOCRINE NEGATIVE: 0

## 2024-09-19 ASSESSMENT — EXTERNAL EXAM - RIGHT EYE: OD_EXAM: NORMAL

## 2024-09-19 ASSESSMENT — VISUAL ACUITY
OS_SC: HM
METHOD: SNELLEN - LINEAR

## 2024-09-19 ASSESSMENT — TONOMETRY
OD_IOP_MMHG: 34
IOP_METHOD: GOLDMANN APPLANATION
OS_IOP_MMHG: 29

## 2024-09-19 ASSESSMENT — EXTERNAL EXAM - LEFT EYE: OS_EXAM: NORMAL

## 2024-09-19 ASSESSMENT — PACHYMETRY
OS_CT(UM): 597
OD_CT(UM): 588

## 2024-09-19 ASSESSMENT — SLIT LAMP EXAM - LIDS
COMMENTS: NORMAL
COMMENTS: NORMAL

## 2024-09-19 NOTE — PROGRESS NOTES
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 Sanon  s/p [facility]7 with STK ou  S/p ce/iol/abic/partial gatt/ab-interno tube revision, left eye  field 5/25/23: WNL OD, severe defect OS now with only central island  Iop remains too high OS, patient has not been taking diamox or drops as instructed  Likely risk of blindness again emphasized  Long discussion, we have limited options for OS going forward and visual prognosis is grim  Recommend proceeding with clear path tube exchange first he was agreeable and then refused due to travel plans. I have disclosed to him that he is high risk for blindness without intervention and that I need to do as soon as possible as I will be on leave October - January. He states he will discuss with his girlfriend and let us know his decision.     continue cosopt BID OU, ,  continue vyzulta OU  continue alphagan bid OU  Increase diamox 1000 mg BID, pt told to stay hydrated, eat a banana a day   Compliance stressed       anterior uveitis, both eyes:  Cme, left eye  Remains improved but not resolved OS  Continu pred qod  continue f/u with Dr. Sanon,importance of f/u stressed!    cataract, right eye  monitor

## 2024-10-03 ENCOUNTER — APPOINTMENT (OUTPATIENT)
Dept: OPHTHALMOLOGY | Facility: CLINIC | Age: 39
End: 2024-10-03
Payer: COMMERCIAL

## 2024-10-03 DIAGNOSIS — H20.9 ANTERIOR UVEITIS: Primary | ICD-10-CM

## 2024-10-03 PROCEDURE — 92134 CPTRZ OPH DX IMG PST SGM RTA: CPT | Performed by: OPHTHALMOLOGY

## 2024-10-03 PROCEDURE — 99024 POSTOP FOLLOW-UP VISIT: CPT | Performed by: OPHTHALMOLOGY

## 2024-10-03 ASSESSMENT — SLIT LAMP EXAM - LIDS
COMMENTS: NORMAL
COMMENTS: NORMAL

## 2024-10-03 ASSESSMENT — REFRACTION_WEARINGRX
OS_SPHERE: -3.25
OS_AXIS: 108
OD_CYLINDER: +1.75
OD_SPHERE: -3.00
OS_CYLINDER: +1.75
OD_AXIS: 077

## 2024-10-03 ASSESSMENT — EXTERNAL EXAM - RIGHT EYE: OD_EXAM: NORMAL

## 2024-10-03 ASSESSMENT — TONOMETRY
IOP_METHOD: GOLDMANN APPLANATION
OD_IOP_MMHG: 19
OS_IOP_MMHG: 20

## 2024-10-03 ASSESSMENT — EXTERNAL EXAM - LEFT EYE: OS_EXAM: NORMAL

## 2024-10-03 ASSESSMENT — VISUAL ACUITY
OS_CC: 20/200
OD_CC: 20/30
CORRECTION_TYPE: GLASSES
OD_CC+: +1
METHOD: SNELLEN - LINEAR

## 2024-10-03 ASSESSMENT — PACHYMETRY
OD_CT(UM): 588
OS_CT(UM): 597

## 2024-10-03 ASSESSMENT — CUP TO DISC RATIO: OD_RATIO: 0.6

## 2024-10-03 NOTE — PROGRESS NOTES
POw9 s/p tube shunt exchange, left  Complicated by total hyphema, now POW8 from anterior chamber (AC) wash out  Tube trimmed at same time as it was found to be long in the AC   IOP is improved but not quiet at goal, tube should be open by now although there is minimal bleb over plate  decrease pred to qday OS, continue qod OD  continue cosopt BID OU,   stop vyzulta ou for now (patient has been out for 3 days)  continue alphagan bid OD, start OS  Decrease diamox  to 500 mg BID  Rtc 2 weeks for IOP check with Dr. Coburn, if IOP continues to improve OS, stop diamox and restart vyzulta OD  If old vit heme continues not to resolve at w12 would have patient see Dr. 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 Sanon  s/p sunil FP7 with STK ou  S/p ce/iol/abic/partial gatt/ab-interno tube revision, left eye  field 5/25/23: WNL OD, severe defect OS now with only central island  Iop remains too high OS, patient has not been taking diamox or drops as instructed  Likely risk of blindness again emphasized  Long discussion, we have limited options for OS going forward and visual prognosis is grim  Recommend proceeding with clear path tube exchange first he was agreeable and then refused due to travel plans. I have disclosed to him that he is high risk for blindness without intervention and that I need to do as soon as possible as I will be on leave October - January. He states he will discuss with his girlfriend and let us know his decision.     continue cosopt BID OU, ,  continue vyzulta OU  continue alphagan bid OU  Increase diamox 1000 mg  BID, pt told to stay hydrated, eat a banana a day   Compliance stressed       anterior uveitis, both eyes:  Cme, left eye  Remains improved but not resolved OS  Continu pred qod  continue f/u with Dr. Sanon,importance of f/u stressed!    cataract, right eye  monitor

## 2024-10-13 NOTE — PROGRESS NOTES
Rheumatology Note      Patient Yogi Lynn III  MRN [id number]     CC: Mr. Yogi Lynn III is a 39 y.o. male presents to the clinic for follow-up of uveitis.  Last seen in clinic in 05/2024    Recall (Rheumatology hx)  -hx of hydradenitis suppurativa, well controlled off treatment, initially referred to Rheumatology by Ophthalmology for bilateral anterior uveitis and mixed glaucoma(open glaucoma& inflammatory) for steroid sparing immunosuppressive regimen  -Patient developed vision loss L>R more than a year ago, and has been receiving ocular steroid drops. (at first was getting intraocular steroid injections).   Infectious workup was negative. RENETTA, HLA b27 negative. ACE neg, CXR WNL.  -His uveitis has been quiescent, we started the patient on Methotrexate 6 tabs with folic acid. Decreased prednisone drops, to once every other day. Vision in right eye okay, less so in the left eye(only 10%)  - Opthalmology suspecting Sarcoid vs HLA-B27. No extra ocular manifestations of Rheumatic diseases.     Current immunosuppression:  - methotrexate 15 mg weekly  - folic acid 1 mg daily    Subjective    Subjective   History of Presenting Illness  Mr. Yogi Lynn III is a 39 y.o. male with a past medical history as mentioned below, coming in today for follow-up of uveitis.    Today's visit:  - left eye has improved since his left eye tube shunt exchange with ophthalmology nine weeks ago for- has had improvement in his visial field from 10% to 50% after the surgery  -R eye normal vision 100%  - last seen in the ophthalmology clinic earlier this month-->uveitis appears to be improved but not resolved  - denies any joint symptoms, no morning stiffness,no symptoms of inflammatory back pain  - No problems with side effects to methotrexate      ROS:  Constitutional: Denies fever, chills, fatigue  Head: Denies headaches or hair loss  ENT: Denies dry mouth, loss of taste, sores in the mouth, nose bleed, or difficulty  "swallowing  Cardiovascular: Denies chest pain, palpitations  Respiratory: Denies shortness of breath or cough or wheezing  Gastrointestinal: Denies nausea, vomiting, heartburn, abdominal pain , diarrhea or blood in the stool  Genitourinary: No urinary urgency, frequency, dysuria   Integumentary: Denies photosensitivity, rash or lesions, Raynaud's or psoriasis  Neurological: Denies any numbness or tingling or weakness  Hematologic/Lymphatic: Denies bleeding, bruising, Raynaud's phenomenon  MSK: As per HPI. No enthesitis, sausage finger, finger tip ulceration, or back pain    Past Medical History:   Diagnosis Date    Body mass index (BMI)30.0-30.9, adult 06/13/2022    BMI 30.0-30.9,adult    Glaucoma     Hidradenitis suppurativa 05/20/2022    Hidradenitis suppurativa    Personal history of diseases of the skin and subcutaneous tissue 09/10/2021    History of hidradenitis suppurativa        No Known Allergies     Objective   Objective     /79   Pulse 60   Ht 1.854 m (6' 1\")   Wt 99.3 kg (219 lb)   BMI 28.89 kg/m²      PHYSICAL EXAM:  General - NAD, pleasant, AAOx3  Head: Normocephalic, atraumatic  Eyes -  No conjunctiva injection.   Mouth/ENT - Moist oral and nasal mucosa. No facial rash.   Skin - No rashes or ulcers. No erythema on bilateral cheeks.  Extremities - No edema, cyanosis ,or clubbing  Neurological - Alert and oriented x 3,  grossly intact. No focal deficit.    Musculoskeletal  Shoulders: Full ROM, without pain, no swelling, warmth or tenderness.  Elbows: Full ROM, without pain, no swelling, warmth or tenderness.  Wrists: Full ROM, without pain, no swelling, warmth or tenderness.  MCP: No swelling, warmth or tenderness. Metacarpal squeeze negative  PIP: No swelling, warmth or tenderness.  DIP: No swelling, warmth or tenderness.  Hands : 5/5.    Sacroiliac joints: No local tenderness. ANDREW negative.   Hips: Full ROM.  No malalignment.  Knees:  Full ROM, without pain, no swelling, warmth or " "point tenderness. No joint line tenderness, no pes anserine tenderness.  Ankles: Full ROM, without pain, swelling, warmth or tenderness.  Toes: No swelling, warmth or tenderness. Metatarsal squeeze negative  Cervical spine: No tenderness or limitation of movement  Lumbar spine: No tenderness or limitation of movement     LABS:  Lab Results   Component Value Date    WBC 9.1 07/29/2024    HGB 14.7 07/29/2024    HCT 43.5 07/29/2024    MCV 86 07/29/2024     07/29/2024      Lab Results   Component Value Date    GLUCOSE 117 (H) 07/29/2024    CO2 26 07/29/2024    BUN 8 07/29/2024    EGFR >90 07/29/2024    CALCIUM 9.6 07/29/2024    ALBUMIN 4.5 07/29/2024      Lab Results   Component Value Date    CRP <0.10 05/13/2024    CRP <0.10 02/12/2024     Lab Results   Component Value Date    SEDRATE 5 05/13/2024    SEDRATE <1 02/12/2024     No results found for: \"C3\", \"C4\"  No results found for: \"RF\", \"CITAB\"  No results found for: \"ANATITER\", \"ARNP\", \"ASMRN\", \"ASSA\", \"ASSB\", \"ASCL\", \"JO1\", \"ACHR\", \"ACEN\", \"RIBPP\", \"DNADS\", \"ANCPA\", \"ANCTI\", \"PR3\", \"MPO\"  Lab Results   Component Value Date    PROTUR NEGATIVE 10/31/2017    GLUCOSEU NEGATIVE 10/31/2017    BLOODU NEGATIVE 10/31/2017    KETONESU NEGATIVE 10/31/2017    NITRITEU NEGATIVE 10/31/2017    LEUKOCYTESU NEGATIVE 10/31/2017     No results found for: \"UTPCR\"     No results found for: \"URICACID\"  No results found for: \"COLORFL\", \"CLARITYFLUID\", \"WBCFL\", \"NEUTROBFREL\", \"LYMPHSBFREL\", \"EOSBFREL\", \"BASOBFREL\", \"PLASMACFLD\", \"RBCFL\", \"CRYSFL\"    IMAGING:       Assessment    Assessment & Plan   Mr. Yogi Lynn III is a 39 y.o. male who presents for follow-up of bilateral anterior uveitis    PMH hydradenitis suppurativa, well controlled off treatment, who was initially referred to us by opthalmology for non infectious bilateral anterior uveitis and mixed glaucoma(open glaucoma& inflammatory) for steroid sparing immunosuppressive regimen. Patient developed vision loss L>R more " than a year ago, and has been receiving ocular steroid drops. (at first was getting intraocular steroid injections). Infectious workup was negative. RENETTA, HLA b27 negative. ACE neg, CXR WNL. He had no systemic manifestations of any Rheumatological diseases such as sarcoidosis, SLE, RA, behcet's, etc. Has been on methotrexate 15 mg weekly + folic acid 1 mg daily for >1 year.    - he has improvement in his left eye vision since his left eye tube shunt exchange with ophthalmology nine weeks ago  - last seen in the ophthalmology clinic earlier this month-->uveitis appears to be improved but not resolved  - today he was agreeable to increasing his methotrexate dose from 15 mg to 20 mg weekly as his left eye uveitis is improved but not resolved on his most recent ophthalmology exam from earlier this month  - routine surveillance labs today  - RTC 3-4 months          Case seen and discussed with Dr. Win  Signature: Bruno Lindsey MD  Date: October 14, 2024

## 2024-10-14 ENCOUNTER — APPOINTMENT (OUTPATIENT)
Dept: RHEUMATOLOGY | Facility: CLINIC | Age: 39
End: 2024-10-14
Payer: COMMERCIAL

## 2024-10-14 ENCOUNTER — LAB (OUTPATIENT)
Dept: LAB | Facility: LAB | Age: 39
End: 2024-10-14
Payer: COMMERCIAL

## 2024-10-14 VITALS
DIASTOLIC BLOOD PRESSURE: 79 MMHG | HEIGHT: 73 IN | HEART RATE: 60 BPM | WEIGHT: 219 LBS | BODY MASS INDEX: 29.03 KG/M2 | SYSTOLIC BLOOD PRESSURE: 117 MMHG

## 2024-10-14 DIAGNOSIS — H20.9 UVEITIS: ICD-10-CM

## 2024-10-14 LAB
ALBUMIN SERPL BCP-MCNC: 4.1 G/DL (ref 3.4–5)
ALP SERPL-CCNC: 90 U/L (ref 33–120)
ALT SERPL W P-5'-P-CCNC: 13 U/L (ref 10–52)
ANION GAP SERPL CALC-SCNC: 10 MMOL/L (ref 10–20)
AST SERPL W P-5'-P-CCNC: 14 U/L (ref 9–39)
BASOPHILS # BLD AUTO: 0.02 X10*3/UL (ref 0–0.1)
BASOPHILS NFR BLD AUTO: 0.5 %
BILIRUB SERPL-MCNC: 0.5 MG/DL (ref 0–1.2)
BUN SERPL-MCNC: 17 MG/DL (ref 6–23)
CALCIUM SERPL-MCNC: 9.1 MG/DL (ref 8.6–10.6)
CHLORIDE SERPL-SCNC: 111 MMOL/L (ref 98–107)
CO2 SERPL-SCNC: 25 MMOL/L (ref 21–32)
CREAT SERPL-MCNC: 0.84 MG/DL (ref 0.5–1.3)
CRP SERPL-MCNC: 0.24 MG/DL
EGFRCR SERPLBLD CKD-EPI 2021: >90 ML/MIN/1.73M*2
EOSINOPHIL # BLD AUTO: 0.1 X10*3/UL (ref 0–0.7)
EOSINOPHIL NFR BLD AUTO: 2.5 %
ERYTHROCYTE [DISTWIDTH] IN BLOOD BY AUTOMATED COUNT: 13.2 % (ref 11.5–14.5)
ERYTHROCYTE [SEDIMENTATION RATE] IN BLOOD BY WESTERGREN METHOD: 3 MM/H (ref 0–15)
GLUCOSE SERPL-MCNC: 93 MG/DL (ref 74–99)
HCT VFR BLD AUTO: 39.7 % (ref 41–52)
HGB BLD-MCNC: 13.3 G/DL (ref 13.5–17.5)
IMM GRANULOCYTES # BLD AUTO: 0.01 X10*3/UL (ref 0–0.7)
IMM GRANULOCYTES NFR BLD AUTO: 0.2 % (ref 0–0.9)
LYMPHOCYTES # BLD AUTO: 1.01 X10*3/UL (ref 1.2–4.8)
LYMPHOCYTES NFR BLD AUTO: 25.1 %
MCH RBC QN AUTO: 29.2 PG (ref 26–34)
MCHC RBC AUTO-ENTMCNC: 33.5 G/DL (ref 32–36)
MCV RBC AUTO: 87 FL (ref 80–100)
MONOCYTES # BLD AUTO: 0.3 X10*3/UL (ref 0.1–1)
MONOCYTES NFR BLD AUTO: 7.4 %
NEUTROPHILS # BLD AUTO: 2.59 X10*3/UL (ref 1.2–7.7)
NEUTROPHILS NFR BLD AUTO: 64.3 %
NRBC BLD-RTO: 0 /100 WBCS (ref 0–0)
PLATELET # BLD AUTO: 286 X10*3/UL (ref 150–450)
POTASSIUM SERPL-SCNC: 4.1 MMOL/L (ref 3.5–5.3)
PROT SERPL-MCNC: 6.3 G/DL (ref 6.4–8.2)
RBC # BLD AUTO: 4.56 X10*6/UL (ref 4.5–5.9)
SODIUM SERPL-SCNC: 142 MMOL/L (ref 136–145)
WBC # BLD AUTO: 4 X10*3/UL (ref 4.4–11.3)

## 2024-10-14 PROCEDURE — 3008F BODY MASS INDEX DOCD: CPT | Performed by: STUDENT IN AN ORGANIZED HEALTH CARE EDUCATION/TRAINING PROGRAM

## 2024-10-14 PROCEDURE — 99214 OFFICE O/P EST MOD 30 MIN: CPT | Performed by: STUDENT IN AN ORGANIZED HEALTH CARE EDUCATION/TRAINING PROGRAM

## 2024-10-14 PROCEDURE — 36415 COLL VENOUS BLD VENIPUNCTURE: CPT

## 2024-10-14 PROCEDURE — 85025 COMPLETE CBC W/AUTO DIFF WBC: CPT

## 2024-10-14 PROCEDURE — 80053 COMPREHEN METABOLIC PANEL: CPT

## 2024-10-14 PROCEDURE — 86140 C-REACTIVE PROTEIN: CPT

## 2024-10-14 PROCEDURE — 1036F TOBACCO NON-USER: CPT | Performed by: STUDENT IN AN ORGANIZED HEALTH CARE EDUCATION/TRAINING PROGRAM

## 2024-10-14 PROCEDURE — 85652 RBC SED RATE AUTOMATED: CPT

## 2024-10-14 RX ORDER — DORZOLAMIDE HYDROCHLORIDE AND TIMOLOL MALEATE 20; 5 MG/ML; MG/ML
1 SOLUTION/ DROPS OPHTHALMIC 2 TIMES DAILY
COMMUNITY

## 2024-10-14 RX ORDER — METHOTREXATE 2.5 MG/1
15 TABLET ORAL
Qty: 90 TABLET | Refills: 0 | Status: SHIPPED | OUTPATIENT
Start: 2024-10-14 | End: 2024-10-14

## 2024-10-14 RX ORDER — METHOTREXATE 2.5 MG/1
20 TABLET ORAL
Qty: 120 TABLET | Refills: 0 | Status: SHIPPED | OUTPATIENT
Start: 2024-10-14

## 2024-10-14 RX ORDER — FOLIC ACID 1 MG/1
1 TABLET ORAL DAILY
Qty: 90 TABLET | Refills: 1 | Status: SHIPPED | OUTPATIENT
Start: 2024-10-14

## 2024-10-14 ASSESSMENT — PAIN SCALES - GENERAL: PAINLEVEL: 0-NO PAIN

## 2024-10-14 NOTE — PROGRESS NOTES
I saw and evaluated the patient. I personally obtained the key and critical portions of the history and physical exam or was physically present for key and critical portions performed by the resident/fellow. I reviewed the resident/fellow's documentation and discussed the patient with the resident/fellow. I agree with the resident/fellow's medical decision making as documented in the note.    Oswald Win MD

## 2024-10-23 ENCOUNTER — APPOINTMENT (OUTPATIENT)
Dept: OPHTHALMOLOGY | Facility: CLINIC | Age: 39
End: 2024-10-23
Payer: COMMERCIAL

## 2024-10-23 DIAGNOSIS — H40.42X3 UVEITIC GLAUCOMA, LEFT, SEVERE STAGE: Primary | ICD-10-CM

## 2024-10-23 DIAGNOSIS — H20.9 UVEITIC GLAUCOMA, LEFT, SEVERE STAGE: Primary | ICD-10-CM

## 2024-10-23 PROCEDURE — 99024 POSTOP FOLLOW-UP VISIT: CPT | Performed by: OPHTHALMOLOGY

## 2024-10-23 ASSESSMENT — PACHYMETRY
OS_CT(UM): 597
OD_CT(UM): 588

## 2024-10-23 ASSESSMENT — ENCOUNTER SYMPTOMS
PSYCHIATRIC NEGATIVE: 0
GASTROINTESTINAL NEGATIVE: 0
NEUROLOGICAL NEGATIVE: 0
ENDOCRINE NEGATIVE: 0
ALLERGIC/IMMUNOLOGIC NEGATIVE: 0
EYES NEGATIVE: 1
HEMATOLOGIC/LYMPHATIC NEGATIVE: 0
RESPIRATORY NEGATIVE: 0
MUSCULOSKELETAL NEGATIVE: 0
CONSTITUTIONAL NEGATIVE: 0
CARDIOVASCULAR NEGATIVE: 0

## 2024-10-23 ASSESSMENT — CONF VISUAL FIELD
OD_SUPERIOR_NASAL_RESTRICTION: 0
OS_SUPERIOR_TEMPORAL_RESTRICTION: 2
OS_INFERIOR_NASAL_RESTRICTION: 1
OD_INFERIOR_NASAL_RESTRICTION: 0
OS_INFERIOR_TEMPORAL_RESTRICTION: 1
OS_SUPERIOR_NASAL_RESTRICTION: 1
OD_NORMAL: 1
OD_INFERIOR_TEMPORAL_RESTRICTION: 0
OD_SUPERIOR_TEMPORAL_RESTRICTION: 0

## 2024-10-23 ASSESSMENT — VISUAL ACUITY
OD_CC: 20/40
OD_PH_CC: 20/30
OD_PH_CC+: +2
OS_CC: 20/400
METHOD: SNELLEN - LINEAR
CORRECTION_TYPE: GLASSES
OD_CC+: -2

## 2024-10-23 ASSESSMENT — TONOMETRY
IOP_METHOD: GOLDMANN APPLANATION
OS_IOP_MMHG: 20
OS_IOP_MMHG: 20
IOP_METHOD: TONOPEN
OD_IOP_MMHG: 15
OD_IOP_MMHG: 15

## 2024-10-23 ASSESSMENT — EXTERNAL EXAM - LEFT EYE: OS_EXAM: NORMAL

## 2024-10-23 ASSESSMENT — SLIT LAMP EXAM - LIDS
COMMENTS: NORMAL
COMMENTS: NORMAL

## 2024-10-23 ASSESSMENT — CUP TO DISC RATIO: OD_RATIO: 0.6

## 2024-10-23 ASSESSMENT — EXTERNAL EXAM - RIGHT EYE: OD_EXAM: NORMAL

## 2024-10-23 NOTE — PROGRESS NOTES
10/23/2024 CC: 39 y.o. 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 Sanon  s/p sunil FP7 with STK ou  S/p ce/iol/abic/partial gatt/ab-interno tube revision, left eye  POw9 s/p tube shunt exchange, left  Complicated by total hyphema, now POW8 from anterior chamber (AC) wash out  Tube trimmed at same time as it was found to be long in the AC   IOP is improved but not quiet at goal, tube should be open by now although there is minimal bleb over plate  decrease pred to qday OS, continue qod OD  continue cosopt BID OU,   stop vyzulta ou for now (patient has been out for 3 days)  continue alphagan bid OD, start OS  Decrease diamox  to 500 mg BID  Rtc 2 weeks for IOP check with Dr. Coburn, if IOP continues to improve OS, stop diamox and restart vyzulta OD  If old vit heme continues not to resolve at w12 would have patient see Dr. Sanon or Nessa for PPV  Rtc sozeri in February for IOP check  10/23/2024: IOP 14/20. [facility]/exam stable. Vit heme persists OS    Plan:   I explained my findings. IOP 14/20    Eye medications:   Both eyes: continue Cosopt bid, Brimonidine bid, PF qod     Diamox qd    Rtc sozeri in February for IOP check  Schedule w/ Dr. Sanon or Nessa

## 2025-01-06 ENCOUNTER — LAB (OUTPATIENT)
Dept: LAB | Facility: LAB | Age: 40
End: 2025-01-06
Payer: COMMERCIAL

## 2025-01-06 ENCOUNTER — APPOINTMENT (OUTPATIENT)
Dept: RHEUMATOLOGY | Facility: CLINIC | Age: 40
End: 2025-01-06
Payer: COMMERCIAL

## 2025-01-06 VITALS
SYSTOLIC BLOOD PRESSURE: 114 MMHG | BODY MASS INDEX: 28.1 KG/M2 | HEART RATE: 64 BPM | HEIGHT: 73 IN | TEMPERATURE: 97.3 F | WEIGHT: 212 LBS | DIASTOLIC BLOOD PRESSURE: 74 MMHG

## 2025-01-06 DIAGNOSIS — H20.9 UVEITIC GLAUCOMA OF LEFT EYE, SEVERE STAGE: ICD-10-CM

## 2025-01-06 DIAGNOSIS — Z92.25 HISTORY OF IMMUNOSUPPRESSION: ICD-10-CM

## 2025-01-06 DIAGNOSIS — H40.42X3 UVEITIC GLAUCOMA OF LEFT EYE, SEVERE STAGE: Primary | ICD-10-CM

## 2025-01-06 DIAGNOSIS — H20.9 UVEITIC GLAUCOMA OF LEFT EYE, SEVERE STAGE: Primary | ICD-10-CM

## 2025-01-06 DIAGNOSIS — H40.42X3 UVEITIC GLAUCOMA OF LEFT EYE, SEVERE STAGE: ICD-10-CM

## 2025-01-06 DIAGNOSIS — H20.9 UVEITIS: ICD-10-CM

## 2025-01-06 LAB
ALBUMIN SERPL BCP-MCNC: 4.3 G/DL (ref 3.4–5)
ALP SERPL-CCNC: 85 U/L (ref 33–120)
ALT SERPL W P-5'-P-CCNC: 20 U/L (ref 10–52)
ANION GAP SERPL CALC-SCNC: 10 MMOL/L (ref 10–20)
AST SERPL W P-5'-P-CCNC: 15 U/L (ref 9–39)
BASOPHILS # BLD AUTO: 0.03 X10*3/UL (ref 0–0.1)
BASOPHILS NFR BLD AUTO: 0.6 %
BILIRUB SERPL-MCNC: 0.4 MG/DL (ref 0–1.2)
BUN SERPL-MCNC: 16 MG/DL (ref 6–23)
CALCIUM SERPL-MCNC: 9.3 MG/DL (ref 8.6–10.6)
CHLORIDE SERPL-SCNC: 110 MMOL/L (ref 98–107)
CO2 SERPL-SCNC: 23 MMOL/L (ref 21–32)
CREAT SERPL-MCNC: 0.79 MG/DL (ref 0.5–1.3)
CRP SERPL-MCNC: <0.1 MG/DL
EGFRCR SERPLBLD CKD-EPI 2021: >90 ML/MIN/1.73M*2
EOSINOPHIL # BLD AUTO: 0.1 X10*3/UL (ref 0–0.7)
EOSINOPHIL NFR BLD AUTO: 2.1 %
ERYTHROCYTE [DISTWIDTH] IN BLOOD BY AUTOMATED COUNT: 13.7 % (ref 11.5–14.5)
ERYTHROCYTE [SEDIMENTATION RATE] IN BLOOD BY WESTERGREN METHOD: 2 MM/H (ref 0–15)
GLUCOSE SERPL-MCNC: 95 MG/DL (ref 74–99)
HCT VFR BLD AUTO: 40.6 % (ref 41–52)
HGB BLD-MCNC: 13.6 G/DL (ref 13.5–17.5)
IMM GRANULOCYTES # BLD AUTO: 0.01 X10*3/UL (ref 0–0.7)
IMM GRANULOCYTES NFR BLD AUTO: 0.2 % (ref 0–0.9)
LYMPHOCYTES # BLD AUTO: 1.18 X10*3/UL (ref 1.2–4.8)
LYMPHOCYTES NFR BLD AUTO: 25.3 %
MCH RBC QN AUTO: 28.6 PG (ref 26–34)
MCHC RBC AUTO-ENTMCNC: 33.5 G/DL (ref 32–36)
MCV RBC AUTO: 86 FL (ref 80–100)
MONOCYTES # BLD AUTO: 0.45 X10*3/UL (ref 0.1–1)
MONOCYTES NFR BLD AUTO: 9.7 %
NEUTROPHILS # BLD AUTO: 2.89 X10*3/UL (ref 1.2–7.7)
NEUTROPHILS NFR BLD AUTO: 62.1 %
NRBC BLD-RTO: 0 /100 WBCS (ref 0–0)
PLATELET # BLD AUTO: 367 X10*3/UL (ref 150–450)
POTASSIUM SERPL-SCNC: 4.1 MMOL/L (ref 3.5–5.3)
PROT SERPL-MCNC: 6.6 G/DL (ref 6.4–8.2)
RBC # BLD AUTO: 4.75 X10*6/UL (ref 4.5–5.9)
SODIUM SERPL-SCNC: 139 MMOL/L (ref 136–145)
WBC # BLD AUTO: 4.7 X10*3/UL (ref 4.4–11.3)

## 2025-01-06 PROCEDURE — 99214 OFFICE O/P EST MOD 30 MIN: CPT | Performed by: STUDENT IN AN ORGANIZED HEALTH CARE EDUCATION/TRAINING PROGRAM

## 2025-01-06 PROCEDURE — 85025 COMPLETE CBC W/AUTO DIFF WBC: CPT

## 2025-01-06 PROCEDURE — 3008F BODY MASS INDEX DOCD: CPT | Performed by: STUDENT IN AN ORGANIZED HEALTH CARE EDUCATION/TRAINING PROGRAM

## 2025-01-06 PROCEDURE — 85652 RBC SED RATE AUTOMATED: CPT

## 2025-01-06 PROCEDURE — 86140 C-REACTIVE PROTEIN: CPT

## 2025-01-06 PROCEDURE — 80053 COMPREHEN METABOLIC PANEL: CPT

## 2025-01-06 RX ORDER — METHOTREXATE 2.5 MG/1
20 TABLET ORAL
Qty: 120 TABLET | Refills: 0 | Status: SHIPPED | OUTPATIENT
Start: 2025-01-06

## 2025-01-06 ASSESSMENT — PAIN SCALES - GENERAL: PAINLEVEL_OUTOF10: 0-NO PAIN

## 2025-01-06 NOTE — PROGRESS NOTES
Rheumatology Note      Patient Yogi Lynn III  MRN [id number]     CC: Mr. Yogi Lynn III is a 39 y.o. male presents to the clinic for follow-up  of idiopathic uveitis.  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 (at first was getting intraocular steroid injections).   Infectious workup was negative. RENETTA, HLA b27 negative. ACE neg, CXR WNL.  -His uveitis has been quiescent, we started the patient on Methotrexate 6 tabs with folic acid. Decreased prednisone drops, to once every other day. Vision in right eye okay, less so in the left eye(only 10%)  - Opthalmology suspecting Sarcoid vs HLA-B27. No extra ocular manifestations of Rheumatic diseases.      Current immunosuppression:  - methotrexate for approximately one year  (previously 15 mg, increased to 20 mg weekly during last clinic visit 10/2024 as his left eye uveitis had improved but not resolved on his ophthalmology exam from early 10/2024)  - folic acid 1 mg daily       Subjective    Subjective   History of Presenting Illness  Mr. Yogi Lynn III is a 39 y.o. male with a past medical history as mentioned below, coming in today for follow-up.     Recently followed with the ophthalmology clinic on 10/23/24, in the ocular exam it was noted that his inflammation has been quieting down.     Continues to take methotrexate 8 tablets weekly and daily folic acid,has tolerated well with no side effects. No joint pain symptoms,oral ulcers,or psoriasis-like rashes. His vision has been limited in the left eye especially centrally however has been overall stable.         Past Medical History:   Diagnosis Date  "   Body mass index (BMI)30.0-30.9, adult 06/13/2022    BMI 30.0-30.9,adult    Glaucoma     Hidradenitis suppurativa 05/20/2022    Hidradenitis suppurativa    Personal history of diseases of the skin and subcutaneous tissue 09/10/2021    History of hidradenitis suppurativa        No Known Allergies     Objective   Objective     There were no vitals taken for this visit.     PHYSICAL EXAM:  General - NAD, pleasant, AAOx3  Head: Normocephalic, atraumatic  Eyes - PERRLA, EOMI. No conjunctiva injection.   Mouth/ENT - No facial rash.   Skin - No rashes or ulcers. No erythema on bilateral cheeks.  Extremities - No edema, cyanosis ,or clubbing  Neurological - Alert and oriented x 3,  grossly intact. No focal deficit.    Musculoskeletal  Shoulders: Full ROM, without pain, no swelling, warmth or tenderness.  Elbows: Full ROM, without pain, no swelling, warmth or tenderness.  Wrists: Full ROM, without pain, no swelling, warmth or tenderness.  MCP: No swelling, warmth or tenderness. Metacarpal squeeze negative  PIP: No swelling, warmth or tenderness.  DIP: No swelling, warmth or tenderness.  Knees:  Full ROM, without pain, no swelling, warmth or point tenderness. No joint line tenderness, no pes anserine tenderness.  Ankles: Full ROM, without pain, swelling, warmth or tenderness.       LABS:  Lab Results   Component Value Date    WBC 4.0 (L) 10/14/2024    HGB 13.3 (L) 10/14/2024    HCT 39.7 (L) 10/14/2024    MCV 87 10/14/2024     10/14/2024      Lab Results   Component Value Date    GLUCOSE 93 10/14/2024    CO2 25 10/14/2024    BUN 17 10/14/2024    EGFR >90 10/14/2024    CALCIUM 9.1 10/14/2024    ALBUMIN 4.1 10/14/2024      Lab Results   Component Value Date    CRP 0.24 10/14/2024    CRP <0.10 05/13/2024    CRP <0.10 02/12/2024     Lab Results   Component Value Date    SEDRATE 3 10/14/2024    SEDRATE 5 05/13/2024    SEDRATE <1 02/12/2024     No results found for: \"C3\", \"C4\"  No results found for: \"RF\", \"CITAB\"  No " "results found for: \"ANATITER\", \"ARNP\", \"ASMRN\", \"ASSA\", \"ASSB\", \"ASCL\", \"JO1\", \"ACHR\", \"ACEN\", \"RIBPP\", \"DNADS\", \"ANCPA\", \"ANCTI\", \"PR3\", \"MPO\"  Lab Results   Component Value Date    PROTUR NEGATIVE 10/31/2017    GLUCOSEU NEGATIVE 10/31/2017    BLOODU NEGATIVE 10/31/2017    KETONESU NEGATIVE 10/31/2017    NITRITEU NEGATIVE 10/31/2017    LEUKOCYTESU NEGATIVE 10/31/2017     No results found for: \"UTPCR\"     No results found for: \"URICACID\"  No results found for: \"COLORFL\", \"CLARITYFLUID\", \"WBCFL\", \"NEUTROBFREL\", \"LYMPHSBFREL\", \"EOSBFREL\", \"BASOBFREL\", \"PLASMACFLD\", \"RBCFL\", \"CRYSFL\"    IMAGING:       Assessment    Assessment & Plan     39 y.o. male with a PMH hydradenitis suppurativa, well controlled off treatment, who was initially referred to us by opthalmology for non infectious idiopathic bilateral anterior uveitis  (L>R) and mixed glaucoma(open glaucoma& inflammatory) for steroid sparing immunosuppressive regimen. Patient developed vision loss L>R more than a year ago, and has been receiving ocular steroid prednisolone drops. (at first was getting intraocular steroid injections). Infectious workup was negative. RENETTA, HLA b27 negative. ACE neg, CXR WNL. He had no systemic manifestations of any Rheumatological diseases such as sarcoidosis, SLE, RA, behcet's, etc. Had been on methotrexate 15 mg weekly + folic acid 1 mg daily for >1 year, increased to 20 mg weekly last clinic visit 10/2024 for better disease control. Underwent a left eye tube shunt exchange with opthalmology in mid-2024.    #Idiopathic uveitis, L>R eye  - ocular  inflammation noted to be quieting down on most recent ocular exam from 10/23/24  - continue methotrexate 20 mg weekly, provided refill  - continue folic acid 1 mg daily  - continue prednisolone eye drops as prescribed by ophthalmology  - continue follow-up with ophthalmology clinic, has upcoming appt 1/29/25  - routine methotrexate surveillance labs today.    RTC 3-4 months     Independently " reviewed three or more labs  Images including CXR, CT, MRI independently reviewed.   Monitoring for drug toxicity     Case seen and discussed with Dr. Daniels  Signature: Bruno Lindsey MD  Date: January 5, 2025

## 2025-01-15 ENCOUNTER — OFFICE VISIT (OUTPATIENT)
Dept: PRIMARY CARE | Facility: CLINIC | Age: 40
End: 2025-01-15
Payer: COMMERCIAL

## 2025-01-15 VITALS
OXYGEN SATURATION: 98 % | BODY MASS INDEX: 28.36 KG/M2 | WEIGHT: 214 LBS | SYSTOLIC BLOOD PRESSURE: 114 MMHG | DIASTOLIC BLOOD PRESSURE: 74 MMHG | HEART RATE: 72 BPM | TEMPERATURE: 97.8 F | HEIGHT: 73 IN | RESPIRATION RATE: 16 BRPM

## 2025-01-15 DIAGNOSIS — R73.03 PREDIABETES: Primary | ICD-10-CM

## 2025-01-15 DIAGNOSIS — M54.50 BILATERAL LOW BACK PAIN WITHOUT SCIATICA, UNSPECIFIED CHRONICITY: ICD-10-CM

## 2025-01-15 DIAGNOSIS — H20.9 ANTERIOR UVEITIS: ICD-10-CM

## 2025-01-15 DIAGNOSIS — N52.8 OTHER MALE ERECTILE DYSFUNCTION: ICD-10-CM

## 2025-01-15 LAB
EST. AVERAGE GLUCOSE BLD GHB EST-MCNC: 111 MG/DL
HBA1C MFR BLD: 5.5 %

## 2025-01-15 PROCEDURE — 1036F TOBACCO NON-USER: CPT | Performed by: FAMILY MEDICINE

## 2025-01-15 PROCEDURE — 99214 OFFICE O/P EST MOD 30 MIN: CPT | Performed by: FAMILY MEDICINE

## 2025-01-15 PROCEDURE — 83036 HEMOGLOBIN GLYCOSYLATED A1C: CPT | Performed by: FAMILY MEDICINE

## 2025-01-15 PROCEDURE — 36415 COLL VENOUS BLD VENIPUNCTURE: CPT | Performed by: FAMILY MEDICINE

## 2025-01-15 PROCEDURE — 3008F BODY MASS INDEX DOCD: CPT | Performed by: FAMILY MEDICINE

## 2025-01-15 RX ORDER — SILDENAFIL 100 MG/1
TABLET, FILM COATED ORAL
Qty: 12 TABLET | Refills: 3 | Status: SHIPPED | OUTPATIENT
Start: 2025-01-15

## 2025-01-15 ASSESSMENT — ENCOUNTER SYMPTOMS
OCCASIONAL FEELINGS OF UNSTEADINESS: 0
DEPRESSION: 0
LOSS OF SENSATION IN FEET: 0

## 2025-01-15 ASSESSMENT — PATIENT HEALTH QUESTIONNAIRE - PHQ9
1. LITTLE INTEREST OR PLEASURE IN DOING THINGS: NOT AT ALL
2. FEELING DOWN, DEPRESSED OR HOPELESS: NOT AT ALL
SUM OF ALL RESPONSES TO PHQ9 QUESTIONS 1 AND 2: 0

## 2025-01-15 ASSESSMENT — PAIN SCALES - GENERAL: PAINLEVEL_OUTOF10: 0-NO PAIN

## 2025-01-15 NOTE — PROGRESS NOTES
Subjective   Patient ID: Yogi Lynn III is a 39 y.o. male who presents to establish care.    #Back pain  - Intermittent sharp pain bilateral lower back 8/10  - Lasts for a few minutes  - Notices while standing but sometimes it just happens unprovoked   - Reports that when he sits for long periods, he has stiffness throughout his body  - No sciatica  - No bowel or bladder incontinence    #Erectile dysfunction  - Difficulty achieving and maintaining erections  - Usually gets morning erections, but not as much as previous   - Denies issues urinating or having a BM  - Denies any numbness of penis/rectum  - Describes having intermittent numbness in the fingers/toes  - Unsure when this started all started  - Previously saw urology and placed on daily cialis, reports that this did not work. Specifically requesting viagra     # Hydradenitis suppurativa  - Well controlled off treatment  - Denies any symptoms currently  - Follows up with rheumatology every 3-4 months     #Glaucoma/Uveitis   - Takes medications as prescribed   - Denies any vision changes  - Developed vision loss L>R more than a year ago, and has been receiving ocular steroid prednisolone drops. (at first was getting intraocular steroid injections)  - RENETTA, HLA b27 negative. ACE neg, CXR WNL. He had no systemic manifestations of any Rheumatological diseases such as sarcoidosis, SLE, RA, behcet's, etc per rheumatology    Social Hx:   - Tobacco - denies  - Substance use - marijuana daily  - Alcohol - drinks tequila on the weekends, 1 pint   - Sexually active - single at the moment, 2 female partners within last 6 months and used condoms with both     # Health Maintenance  - Diet: Eats fair (eats junk and healthy foods; tries to eat well balanced)   - Lives alone   - Reports he got his flu vaccine in 2024    - Denies chest pain/sob/lightheadedness/syncope    Review of Systems  10 point ROS negative except for as stated in HPI  Previous history  Past Medical  "History:   Diagnosis Date    Body mass index (BMI)30.0-30.9, adult 06/13/2022    BMI 30.0-30.9,adult    Glaucoma     Hidradenitis suppurativa 05/20/2022    Hidradenitis suppurativa    Personal history of diseases of the skin and subcutaneous tissue 09/10/2021    History of hidradenitis suppurativa     Past Surgical History:   Procedure Laterality Date    IRIDOTOMY / IRIDECTOMY       Social History     Tobacco Use    Smoking status: Never     Passive exposure: Past    Smokeless tobacco: Never   Vaping Use    Vaping status: Never Used   Substance Use Topics    Alcohol use: Yes     Comment: occ    Drug use: Yes     Types: Marijuana     Family History   Problem Relation Name Age of Onset    Diabetes Mother     Father - HTN    No Known Allergies  Current Outpatient Medications   Medication Instructions    acetaZOLAMIDE (DIAMOX) 1,000 mg, oral, 2 times daily    brimonidine (Alphagan P) 0.1 % ophthalmic solution 1 drop, Both Eyes, 2 times daily    camphor-menthoL (Sarna) lotion As needed    clindamycin (Cleocin T) 1 % lotion 2 times daily    dorzolamide-timoloL (Cosopt) 22.3-6.8 mg/mL ophthalmic solution 1 drop, 2 times daily    folic acid (FOLVITE) 1 mg, oral, Daily    ketoconazole (NIZOral) 2 % shampoo Apply topically.    methotrexate (TREXALL) 20 mg, oral, Once Weekly, Follow directions carefully, and ask to explain any part you do not understand. Take exactly as directed.    prednisoLONE acetate (Pred-Forte) 1 % ophthalmic suspension 1 drop, Left Eye, 4 times daily    tacrolimus (Protopic) 0.1 % ointment 1 Application    tadalafil (CIALIS) 5 mg, oral, Daily    Vyzulta 0.024 % drops SCHEDULE 1 DROP LEFT EYE NIGHTLY       Objective   /74   Pulse 72   Temp 36.6 °C (97.8 °F)   Resp 16   Ht 1.854 m (6' 1\")   Wt 97.1 kg (214 lb)   SpO2 98%   BMI 28.23 kg/m²     Physical Exam  Constitutional:       Appearance: Normal appearance.   HENT:      Head: Normocephalic and atraumatic.      Right Ear: External ear normal. "      Left Ear: External ear normal.      Nose: Nose normal.      Mouth/Throat:      Mouth: Mucous membranes are moist.   Eyes:      Extraocular Movements: Extraocular movements intact.      Conjunctiva/sclera: Conjunctivae normal.      Pupils: Pupils are equal, round, and reactive to light.   Cardiovascular:      Rate and Rhythm: Normal rate and regular rhythm.      Pulses: Normal pulses.      Heart sounds: Normal heart sounds.   Pulmonary:      Effort: Pulmonary effort is normal.      Breath sounds: Normal breath sounds.   Abdominal:      General: Bowel sounds are normal.      Palpations: Abdomen is soft.   Musculoskeletal:         General: Normal range of motion.      Cervical back: Normal range of motion.   Skin:     General: Skin is warm.      Capillary Refill: Capillary refill takes less than 2 seconds.   Neurological:      General: No focal deficit present.      Mental Status: He is alert and oriented to person, place, and time.   Psychiatric:         Mood and Affect: Mood normal.         Behavior: Behavior normal.         Thought Content: Thought content normal.         Judgment: Judgment normal.     MSK: No tenderness to palpation lumbar spine, full ROM, negative straight leg raise    Assessment/Plan   Yogi Lynn III is a 39 y.o. 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 Spinal stenosis vs. Degenerative disc disease  - Xray of the back to rule out axial spondyloarthritis   - Consider PT vs further evaluation by rheumatology     #Erectile dysfunction  - Patient describes consistent and recurrent inability to attain and/or maintain a erection, in addition to intermittent lack of morning erections  - He denies anxiety/depression at this time so cause of ED likely not psychogenic  - Discussed that Methotrexate typically would not contribute to his  erectile dysfunction  - Couseled on reduction  alcohol intake and educated on cessation of marijuana, which could contribute to ED  - Prediabetic, HbA1C to be obtained with next labs  - Patient has tried Cialis in the past, but was not satisfied with effects  - Prescription for viagara as needed; instructions given on how to use   - Discussed possible urology consult if measures unsuccessful    # Hydradenitis suppurativa  - Well controlled off treatment  - Denies any symptoms currently  - Continue to follow up with rheumatology every 3-4 months     #Glaucoma/Uveitis   - Takes medications as prescribed   - Denies any vision changes currently   - Developed vision loss L>R in 2023, and has been receiving ocular steroid prednisolone drops. (at first was getting intraocular steroid injections)  - RENETTA, HLA b27 negative. ACE neg, CXR WNL. He had no systemic manifestations of any Rheumatological diseases such as sarcoidosis, SLE, RA, behcet's, etc per rheumatology  - Continue to follow with ophthalmology and rheumatology    # Health Maintenance  - Flu vaccine: recommended annually, thinks he got it in 2024  - Pneuomvax (PPSV23): N/A  - Prevnar (PCV13): not indicated  - Tdap: Next due 7/2/2025  - HPV (<45): N/A  - Shingrix(50+): not indicated  - Hep C: Negative in 2024  - HIV: Negative in 2024  - Syphilis: Negative in 2024  - Lipid Panel (35M,45F): WNL in 2024   - DM screening: Ordered today  - HTN screening: Complete at [facility]  - Vitamin D 25-OH: N/A  - Depression: PHQ-2 : 0 today   - Tobacco Cessation: N/A  - Last Dental: recommended follow up  - Last Eye exam: recommended continued follow up with ophthalmology  - Colonoscopy (45-75): N/A  - AAA screening (65-75M): not indicated  - Lung CA screening (55-80): N/A  - Osteoporosis (65+F): not indicated       Return in : 3 months    Discussed and evaluated with co-signer of note Dr. Marroquin.      Portions of this note were generated using digital voice recognition software, and may  contain grammatical errors       Ana Granados, [facility], [place]  Family Medicine MS-3     Patient was seen and examined by myself in conjunction with medical student. I have edited note above. Yash Marroquin

## 2025-01-16 ENCOUNTER — HOSPITAL ENCOUNTER (OUTPATIENT)
Dept: RADIOLOGY | Facility: CLINIC | Age: 40
Discharge: HOME | End: 2025-01-16
Payer: COMMERCIAL

## 2025-01-16 DIAGNOSIS — M54.50 BILATERAL LOW BACK PAIN WITHOUT SCIATICA, UNSPECIFIED CHRONICITY: ICD-10-CM

## 2025-01-16 PROCEDURE — 72110 X-RAY EXAM L-2 SPINE 4/>VWS: CPT

## 2025-01-24 DIAGNOSIS — H40.1134 PRIMARY OPEN-ANGLE GLAUCOMA, BILATERAL, INDETERMINATE STAGE: ICD-10-CM

## 2025-01-24 RX ORDER — DORZOLAMIDE HYDROCHLORIDE AND TIMOLOL MALEATE 20; 5 MG/ML; MG/ML
1 SOLUTION/ DROPS OPHTHALMIC 2 TIMES DAILY
Qty: 30 ML | Refills: 3 | Status: SHIPPED | OUTPATIENT
Start: 2025-01-24

## 2025-01-29 ENCOUNTER — APPOINTMENT (OUTPATIENT)
Dept: OPHTHALMOLOGY | Facility: CLINIC | Age: 40
End: 2025-01-29
Payer: COMMERCIAL

## 2025-02-22 NOTE — PROGRESS NOTES
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 Barton, Dr. Coubrn  s/p sunil FP7 with STK ou  S/p ce/iol/abic/partial gatt/ab-interno tube revision, left eye  s/p tube shunt exchange, left  Saw  10/23/24, IOPs were 14/20, continued on Cosopt bid, Brimonidine bid, Diamox QD  Concern for persistent VH OS at that visit after sx, referred back to Retina  Old VH OS appears significantly improved, was previously noted as having no view to retina but fully visible today - still some mild haziness, suspect due to PCO and/or vitreous debris  Discussed with patient that given extensive ocular history unclear visual potential in OS but could still consider interventions to see if any improvement  Seeing Dr. Barton in one month - can see if she thinks YAG capsulotomy would be reasonable to consider, with understanding that may still need vitrectomy if still with significant haze afterwards. Alternative approach could be PPV with surgical capsulotomy depending on Dr. Barton's recommendations.     Continue drops (gtts), follow up 2 months    #Panuveitis, [id number]  #Anterior [id number]  Has seen Dr. Sanon last 5/2024  most likely underlying dx of sarcoid  Previous workup: Infectious workup was negative. RENETTA, HLA b27 negative. ACE neg, CXR WNL   Has history of IOP rise on steroid drops per patient  S/p STK OS 4/2024  Currently on MTX per Rheum  No pain today, Vision remains very blurred OS  Right eye doing well  Inflammation seems to be  under contorl on PF QOD, quiet OD but with evidence of old inflammation - cells in formed vitreous, no active vitritis, few pigments keratitic precipitates (KPs)  OS on PF daily - appears quiet, monitor  F/up as above

## 2025-02-24 ENCOUNTER — APPOINTMENT (OUTPATIENT)
Dept: OPHTHALMOLOGY | Facility: CLINIC | Age: 40
End: 2025-02-24
Payer: COMMERCIAL

## 2025-02-24 DIAGNOSIS — H40.42X3 UVEITIC GLAUCOMA, LEFT, SEVERE STAGE: ICD-10-CM

## 2025-02-24 DIAGNOSIS — H44.113 PANUVEITIS OF BOTH EYES: Primary | ICD-10-CM

## 2025-02-24 DIAGNOSIS — H20.9 UVEITIC GLAUCOMA, LEFT, SEVERE STAGE: ICD-10-CM

## 2025-02-24 PROCEDURE — 99213 OFFICE O/P EST LOW 20 MIN: CPT | Performed by: OPHTHALMOLOGY

## 2025-02-24 PROCEDURE — 92134 CPTRZ OPH DX IMG PST SGM RTA: CPT | Performed by: OPHTHALMOLOGY

## 2025-02-24 ASSESSMENT — CONF VISUAL FIELD
OS_INFERIOR_NASAL_RESTRICTION: 0
OD_NORMAL: 1
OS_SUPERIOR_TEMPORAL_RESTRICTION: 0
OD_SUPERIOR_TEMPORAL_RESTRICTION: 0
OS_NORMAL: 1
OS_INFERIOR_TEMPORAL_RESTRICTION: 0
OD_INFERIOR_TEMPORAL_RESTRICTION: 0
OD_INFERIOR_NASAL_RESTRICTION: 0
OS_SUPERIOR_NASAL_RESTRICTION: 0
OD_SUPERIOR_NASAL_RESTRICTION: 0

## 2025-02-24 ASSESSMENT — VISUAL ACUITY
OS_CC: HM
METHOD: SNELLEN - LINEAR
OD_CC: 20/30+2
CORRECTION_TYPE: GLASSES

## 2025-02-24 ASSESSMENT — PACHYMETRY
OS_CT(UM): 597
OD_CT(UM): 588

## 2025-02-24 ASSESSMENT — TONOMETRY
IOP_METHOD: GOLDMANN APPLANATION
OD_IOP_MMHG: 14
OS_IOP_MMHG: 16

## 2025-02-27 ASSESSMENT — EXTERNAL EXAM - LEFT EYE: OS_EXAM: NORMAL

## 2025-02-27 ASSESSMENT — SLIT LAMP EXAM - LIDS
COMMENTS: NORMAL
COMMENTS: NORMAL

## 2025-02-27 ASSESSMENT — CUP TO DISC RATIO
OS_RATIO: .99
OD_RATIO: 0.6

## 2025-02-27 ASSESSMENT — EXTERNAL EXAM - RIGHT EYE: OD_EXAM: NORMAL

## 2025-03-06 ENCOUNTER — APPOINTMENT (OUTPATIENT)
Dept: OPHTHALMOLOGY | Facility: CLINIC | Age: 40
End: 2025-03-06
Payer: COMMERCIAL

## 2025-03-06 DIAGNOSIS — H44.113 PANUVEITIS OF BOTH EYES: Primary | ICD-10-CM

## 2025-03-06 DIAGNOSIS — H42 GLAUCOMA OF RIGHT EYE ASSOCIATED WITH UNDERLYING DISEASE: ICD-10-CM

## 2025-03-06 DIAGNOSIS — H40.42X3 UVEITIC GLAUCOMA, LEFT, SEVERE STAGE: ICD-10-CM

## 2025-03-06 DIAGNOSIS — H26.492 LEFT POSTERIOR CAPSULAR OPACIFICATION: ICD-10-CM

## 2025-03-06 DIAGNOSIS — H20.9 UVEITIC GLAUCOMA, LEFT, SEVERE STAGE: ICD-10-CM

## 2025-03-06 PROCEDURE — 99214 OFFICE O/P EST MOD 30 MIN: CPT | Performed by: OPHTHALMOLOGY

## 2025-03-06 ASSESSMENT — VISUAL ACUITY
OS_CC: CF AT FACE
CORRECTION_TYPE: GLASSES
OD_CC: 20/25
OD_CC+: -1
METHOD: SNELLEN - LINEAR

## 2025-03-06 ASSESSMENT — ENCOUNTER SYMPTOMS
PSYCHIATRIC NEGATIVE: 0
CARDIOVASCULAR NEGATIVE: 0
ALLERGIC/IMMUNOLOGIC NEGATIVE: 0
HEMATOLOGIC/LYMPHATIC NEGATIVE: 0
NEUROLOGICAL NEGATIVE: 0
GASTROINTESTINAL NEGATIVE: 0
ENDOCRINE NEGATIVE: 0
RESPIRATORY NEGATIVE: 0
CONSTITUTIONAL NEGATIVE: 0
MUSCULOSKELETAL NEGATIVE: 0
EYES NEGATIVE: 1

## 2025-03-06 ASSESSMENT — EXTERNAL EXAM - RIGHT EYE: OD_EXAM: NORMAL

## 2025-03-06 ASSESSMENT — PACHYMETRY
OS_CT(UM): 597
OD_CT(UM): 588

## 2025-03-06 ASSESSMENT — SLIT LAMP EXAM - LIDS
COMMENTS: NORMAL
COMMENTS: NORMAL

## 2025-03-06 ASSESSMENT — TONOMETRY
OD_IOP_MMHG: 24
OS_IOP_MMHG: 26
IOP_METHOD: GOLDMANN APPLANATION

## 2025-03-06 ASSESSMENT — CUP TO DISC RATIO
OS_RATIO: .99
OD_RATIO: 0.6

## 2025-03-06 ASSESSMENT — EXTERNAL EXAM - LEFT EYE: OS_EXAM: NORMAL

## 2025-03-06 NOTE — PROGRESS NOTES
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 Sanon  s/p ahmed FP7 with STK ou  S/p ce/iol/abic/partial gatt/ab-interno tube revision, left eye  S/p ahmed to clear path tube exchange, left eye  Complicated by post op totat hyphema  S/p anterior chamber (AC) washout  IOP improved but not at goal    continue cosopt BID OU, ,  continue alphagan bid OU  Restart vyzulta OU  Stop diamox    Pco, LEFT EYE  Agree with Dr. Lopez appears to be culprit for diminished vision over vitreous opacities. RBA to YAG cap reviewed with patient, he wishes to proceed. Will schedule    anterior uveitis, both eyes:  Cme, left eye  Remains improved but not resolved OS  Continu pred qod OD decrease pred to qod os    cataract, right eye  monitor

## 2025-03-07 ENCOUNTER — DOCUMENTATION (OUTPATIENT)
Dept: OPHTHALMOLOGY | Facility: CLINIC | Age: 40
End: 2025-03-07
Payer: COMMERCIAL

## 2025-03-07 NOTE — PROGRESS NOTES
Dr. Barton would like patient to have YAG os and follow up:     I called pt to scheduled and offered all days we have avilable to do Laser @ [facility], [facility],  [place] right now our schedule does not match with his. Patient states he will call back to schedule this procedure and defer it for now.     -Mata

## 2025-03-17 ENCOUNTER — APPOINTMENT (OUTPATIENT)
Dept: UROLOGY | Facility: HOSPITAL | Age: 40
End: 2025-03-17
Payer: COMMERCIAL

## 2025-04-27 DIAGNOSIS — H20.9 UVEITIS: ICD-10-CM

## 2025-04-28 ENCOUNTER — APPOINTMENT (OUTPATIENT)
Dept: OPHTHALMOLOGY | Facility: CLINIC | Age: 40
End: 2025-04-28
Payer: COMMERCIAL

## 2025-04-28 RX ORDER — METHOTREXATE 2.5 MG/1
20 TABLET ORAL
Qty: 120 TABLET | Refills: 0 | Status: SHIPPED | OUTPATIENT
Start: 2025-05-04 | End: 2025-04-28 | Stop reason: SDUPTHER

## 2025-04-28 RX ORDER — METHOTREXATE 2.5 MG/1
20 TABLET ORAL
Qty: 120 TABLET | Refills: 0 | Status: SHIPPED | OUTPATIENT
Start: 2025-05-04

## 2025-05-12 ENCOUNTER — APPOINTMENT (OUTPATIENT)
Dept: RHEUMATOLOGY | Facility: CLINIC | Age: 40
End: 2025-05-12
Payer: COMMERCIAL

## 2025-06-02 ENCOUNTER — APPOINTMENT (OUTPATIENT)
Dept: OPHTHALMOLOGY | Facility: CLINIC | Age: 40
End: 2025-06-02
Payer: COMMERCIAL

## 2025-06-09 ENCOUNTER — APPOINTMENT (OUTPATIENT)
Dept: OPHTHALMOLOGY | Facility: CLINIC | Age: 40
End: 2025-06-09
Payer: COMMERCIAL

## 2025-06-09 DIAGNOSIS — H26.492 LEFT POSTERIOR CAPSULAR OPACIFICATION: Primary | ICD-10-CM

## 2025-06-09 PROCEDURE — 66821 AFTER CATARACT LASER SURGERY: CPT | Mod: LEFT SIDE | Performed by: OPHTHALMOLOGY

## 2025-06-09 ASSESSMENT — ENCOUNTER SYMPTOMS
ALLERGIC/IMMUNOLOGIC NEGATIVE: 0
GASTROINTESTINAL NEGATIVE: 0
HEMATOLOGIC/LYMPHATIC NEGATIVE: 0
RESPIRATORY NEGATIVE: 0
MUSCULOSKELETAL NEGATIVE: 0
CONSTITUTIONAL NEGATIVE: 0
NEUROLOGICAL NEGATIVE: 0
CARDIOVASCULAR NEGATIVE: 0
EYES NEGATIVE: 0
PSYCHIATRIC NEGATIVE: 0
ENDOCRINE NEGATIVE: 0

## 2025-06-09 ASSESSMENT — EXTERNAL EXAM - LEFT EYE: OS_EXAM: NORMAL

## 2025-06-09 ASSESSMENT — TONOMETRY
IOP_METHOD: GOLDMANN APPLANATION
OS_IOP_MMHG: 25
IOP_METHOD: GOLDMANN APPLANATION
OD_IOP_MMHG: 24

## 2025-06-09 ASSESSMENT — SLIT LAMP EXAM - LIDS
COMMENTS: NORMAL
COMMENTS: NORMAL

## 2025-06-09 ASSESSMENT — VISUAL ACUITY
METHOD: SNELLEN - LINEAR
OS_CC: CF AT 3'
OD_CC: 20/25

## 2025-06-09 ASSESSMENT — CUP TO DISC RATIO
OS_RATIO: .99
OD_RATIO: 0.6

## 2025-06-09 ASSESSMENT — PACHYMETRY
OS_CT(UM): 597
OD_CT(UM): 588

## 2025-06-09 ASSESSMENT — EXTERNAL EXAM - RIGHT EYE: OD_EXAM: NORMAL

## 2025-07-03 ENCOUNTER — APPOINTMENT (OUTPATIENT)
Dept: OPHTHALMOLOGY | Facility: CLINIC | Age: 40
End: 2025-07-03
Payer: COMMERCIAL

## 2025-07-03 DIAGNOSIS — H40.42X3 UVEITIC GLAUCOMA, LEFT, SEVERE STAGE: ICD-10-CM

## 2025-07-03 DIAGNOSIS — H25.11 AGE-RELATED NUCLEAR CATARACT OF RIGHT EYE: ICD-10-CM

## 2025-07-03 DIAGNOSIS — H42 GLAUCOMA OF RIGHT EYE ASSOCIATED WITH UNDERLYING DISEASE: Primary | ICD-10-CM

## 2025-07-03 DIAGNOSIS — H20.9 UVEITIC GLAUCOMA, LEFT, SEVERE STAGE: ICD-10-CM

## 2025-07-03 RX ORDER — ACETAZOLAMIDE 500 MG/1
500 CAPSULE, EXTENDED RELEASE ORAL 2 TIMES DAILY
Qty: 60 CAPSULE | Refills: 3 | Status: SHIPPED | OUTPATIENT
Start: 2025-07-03 | End: 2026-07-03

## 2025-07-03 ASSESSMENT — ENCOUNTER SYMPTOMS
ENDOCRINE NEGATIVE: 0
ALLERGIC/IMMUNOLOGIC NEGATIVE: 0
CONSTITUTIONAL NEGATIVE: 0
HEMATOLOGIC/LYMPHATIC NEGATIVE: 0
EYES NEGATIVE: 1
MUSCULOSKELETAL NEGATIVE: 0
GASTROINTESTINAL NEGATIVE: 0
CARDIOVASCULAR NEGATIVE: 0
RESPIRATORY NEGATIVE: 0
PSYCHIATRIC NEGATIVE: 0
NEUROLOGICAL NEGATIVE: 0

## 2025-07-03 ASSESSMENT — VISUAL ACUITY
OD_CC+: -2
OS_CC: CF AT 3'
CORRECTION_TYPE: GLASSES
METHOD: SNELLEN - LINEAR
OD_CC: 20/25

## 2025-07-03 ASSESSMENT — SLIT LAMP EXAM - LIDS
COMMENTS: NORMAL
COMMENTS: NORMAL

## 2025-07-03 ASSESSMENT — PACHYMETRY
OS_CT(UM): 597
OD_CT(UM): 588

## 2025-07-03 ASSESSMENT — EXTERNAL EXAM - LEFT EYE: OS_EXAM: NORMAL

## 2025-07-03 ASSESSMENT — CUP TO DISC RATIO
OD_RATIO: 0.6
OS_RATIO: .99

## 2025-07-03 ASSESSMENT — TONOMETRY
IOP_METHOD: GOLDMANN APPLANATION
OS_IOP_MMHG: 28
OD_IOP_MMHG: 30

## 2025-07-03 ASSESSMENT — EXTERNAL EXAM - RIGHT EYE: OD_EXAM: NORMAL

## 2025-07-03 NOTE — PROGRESS NOTES
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 Sanon  s/p ahmed FP7 with STK ou  S/p ce/iol/abic/partial gatt/ab-interno tube revision, left eye  S/p ahmed to clear path tube exchange, left eye  Complicated by post op totat hyphema  S/p anterior chamber (AC) washout  IOP improved but not at goal    continue cosopt BID OU, ,  continue alphagan bid OU  Continue vyzulta OU  Restart diamox 500 mg BID     Pco, LEFT EYE  S/p yag cap    anterior uveitis, both eyes:  Cme, left eye  Remains improved but not resolved OS  Continu pred qod Ou    cataract, right eye  Monitor    Vitreous hemorrhage, left eye  Establish with Dr. Szymanski  Given non-clearing nature consider ppv

## 2025-07-10 ENCOUNTER — APPOINTMENT (OUTPATIENT)
Dept: OPHTHALMOLOGY | Facility: CLINIC | Age: 40
End: 2025-07-10
Payer: COMMERCIAL

## 2025-07-21 ENCOUNTER — APPOINTMENT (OUTPATIENT)
Dept: RHEUMATOLOGY | Facility: CLINIC | Age: 40
End: 2025-07-21
Payer: COMMERCIAL

## 2025-07-21 VITALS
BODY MASS INDEX: 29.29 KG/M2 | WEIGHT: 221 LBS | TEMPERATURE: 96.8 F | HEIGHT: 73 IN | SYSTOLIC BLOOD PRESSURE: 125 MMHG | DIASTOLIC BLOOD PRESSURE: 74 MMHG

## 2025-07-21 DIAGNOSIS — Z79.61 LONG TERM (CURRENT) USE OF IMMUNOMODULATOR: ICD-10-CM

## 2025-07-21 DIAGNOSIS — Z79.899 ON LONG TERM DRUG THERAPY: ICD-10-CM

## 2025-07-21 DIAGNOSIS — H40.42X3 UVEITIC GLAUCOMA OF LEFT EYE, SEVERE STAGE: Primary | ICD-10-CM

## 2025-07-21 DIAGNOSIS — H20.9 UVEITIC GLAUCOMA OF LEFT EYE, SEVERE STAGE: Primary | ICD-10-CM

## 2025-07-21 PROCEDURE — 99214 OFFICE O/P EST MOD 30 MIN: CPT | Performed by: STUDENT IN AN ORGANIZED HEALTH CARE EDUCATION/TRAINING PROGRAM

## 2025-07-21 PROCEDURE — 3008F BODY MASS INDEX DOCD: CPT | Performed by: STUDENT IN AN ORGANIZED HEALTH CARE EDUCATION/TRAINING PROGRAM

## 2025-07-21 PROCEDURE — 1036F TOBACCO NON-USER: CPT | Performed by: STUDENT IN AN ORGANIZED HEALTH CARE EDUCATION/TRAINING PROGRAM

## 2025-07-21 RX ORDER — FOLIC ACID 1 MG/1
1 TABLET ORAL DAILY
Qty: 90 TABLET | Refills: 1 | Status: SHIPPED | OUTPATIENT
Start: 2025-07-21

## 2025-07-21 RX ORDER — BRIMONIDINE TARTRATE 1 MG/ML
1 SOLUTION/ DROPS OPHTHALMIC 2 TIMES DAILY
COMMUNITY
Start: 2025-05-28

## 2025-07-21 RX ORDER — METHOTREXATE 2.5 MG/1
20 TABLET ORAL
Qty: 120 TABLET | Refills: 0 | Status: SHIPPED | OUTPATIENT
Start: 2025-07-21

## 2025-07-21 NOTE — PROGRESS NOTES
Rheumatology Note      Patient Yogi Lynn III  MRN [id number]     CC: Mr. Yogi Lynn III is a 40 y.o. male presents to the clinic for follow-up  of idiopathic uveitis.  Last seen in clinic 1/2025       Recall (Rheumatology hx)  -hx of hydradenitis suppurativa, well controlled off treatment, initially referred to Rheumatology by Ophthalmology for bilateral anterior uveitis and mixed glaucoma(open glaucoma& inflammatory) for steroid sparing immunosuppressive regimen  -Patient developed vision loss L>R more than a year ago, and has been receiving ocular steroid drops. (at first was getting intraocular steroid injections).   Infectious workup was negative. RENETTA, HLA b27 negative. ACE neg, CXR WNL.  -His uveitis has been quiescent, we started the patient on Methotrexate 6 tabs with folic acid. Decreased prednisone drops, to once every other day. Vision in right eye okay, less so in the left eye(only 10%)  - Opthalmology suspecting Sarcoid vs HLA-B27. No extra ocular manifestations of Rheumatic diseases.      Current immunosuppression:  - methotrexate for approximately one year  (previously 15 mg, increased to 20 mg weekly during last clinic visit 10/2024 as his left eye uveitis had improved but not resolved on his ophthalmology exam from early 10/2024)  - folic acid 1 mg daily       Subjective    Subjective   History of Presenting Illness  Mr. Yogi Lynn III is a 40 y.o. male with a past medical history as mentioned below, coming in today for follow-up.     Overall doing well and eye symptoms have been stable. Does report blurriness in the bilateral eyes (L>R) in the setting of a vitreous hemorrhage in the left eye.    He is up to date on his eye exam,last seen in the ophthalmology clinic earlier this month-->noted that inflammation in the left eye remains improved but not fully resolved, continues to use Pred-Forte eye drops.         Past Medical History:   Diagnosis Date    Arthritis Na    Body mass index  (BMI)30.0-30.9, adult 06/13/2022    BMI 30.0-30.9,adult    Glaucoma Na    Hidradenitis suppurativa 05/20/2022    Hidradenitis suppurativa    Joint pain Na    Personal history of diseases of the skin and subcutaneous tissue 09/10/2021    History of hidradenitis suppurativa    Vision loss NA        No Known Allergies     Objective   Objective     There were no vitals taken for this visit.     PHYSICAL EXAM:  General - NAD, pleasant, AAOx3  Head: Normocephalic, atraumatic  Eyes - PERRLA, EOMI. +Bilateral conjunctival injection  Mouth/ENT - No facial rash.   Skin - No rashes or ulcers. No erythema on bilateral cheeks.  Extremities - No edema, cyanosis ,or clubbing  Neurological - Alert and oriented x 3,  grossly intact. No focal deficit.    Musculoskeletal  Shoulders: Full ROM, without pain, no swelling, warmth or tenderness.  Elbows: Full ROM, without pain, no swelling, warmth or tenderness.  Wrists: Full ROM, without pain, no swelling, warmth or tenderness.  MCP: No swelling, warmth or tenderness. Metacarpal squeeze negative  PIP: No swelling, warmth or tenderness.  DIP: No swelling, warmth or tenderness.  Knees:  Full ROM, without pain, no swelling, warmth or point tenderness. No joint line tenderness, no pes anserine tenderness.  Ankles: Full ROM, without pain, swelling, warmth or tenderness.       LABS:  Lab Results   Component Value Date    WBC 4.7 01/06/2025    HGB 13.6 01/06/2025    HCT 40.6 (L) 01/06/2025    MCV 86 01/06/2025     01/06/2025      Lab Results   Component Value Date    GLUCOSE 95 01/06/2025    CO2 23 01/06/2025    BUN 16 01/06/2025    EGFR >90 01/06/2025    CALCIUM 9.3 01/06/2025    ALBUMIN 4.3 01/06/2025      Lab Results   Component Value Date    CRP <0.10 01/06/2025    CRP 0.24 10/14/2024    CRP <0.10 05/13/2024    CRP <0.10 02/12/2024     Lab Results   Component Value Date    SEDRATE 2 01/06/2025    SEDRATE 3 10/14/2024    SEDRATE 5 05/13/2024    SEDRATE <1 02/12/2024     No results  "found for: \"C3\", \"C4\"  No results found for: \"RF\", \"CITAB\"  No results found for: \"ANATITER\", \"ARNP\", \"ASMRN\", \"ASSA\", \"ASSB\", \"ASCL\", \"JO1\", \"ACHR\", \"ACEN\", \"RIBPP\", \"DNADS\", \"ANCPA\", \"ANCTI\", \"PR3\", \"MPO\"  Lab Results   Component Value Date    PROTUR NEGATIVE 10/31/2017    GLUCOSEU NEGATIVE 10/31/2017    BLOODU NEGATIVE 10/31/2017    KETONESU NEGATIVE 10/31/2017    NITRITEU NEGATIVE 10/31/2017    LEUKOCYTESU NEGATIVE 10/31/2017     No results found for: \"UTPCR\"     No results found for: \"URICACID\"  No results found for: \"COLORFL\", \"CLARITYFLUID\", \"WBCFL\", \"NEUTROBFREL\", \"LYMPHSBFREL\", \"EOSBFREL\", \"BASOBFREL\", \"PLASMACFLD\", \"RBCFL\", \"CRYSFL\"    IMAGING:       Assessment    Assessment & Plan     39 y.o. male with a PMH hydradenitis suppurativa, well controlled off treatment, who was initially referred to us by opthalmology for non infectious idiopathic bilateral anterior uveitis  (L>R) and mixed glaucoma(open glaucoma& inflammatory) for steroid sparing immunosuppressive regimen. Patient developed vision loss L>R more than a year ago, and has been receiving ocular steroid prednisolone drops. (at first was getting intraocular steroid injections). Infectious workup was negative. RENETTA, HLA b27 negative. ACE neg, CXR WNL. He had no systemic manifestations of any Rheumatological diseases such as sarcoidosis, SLE, RA, behcet's, etc. Had been on methotrexate 15 mg weekly + folic acid 1 mg daily for >1 year, increased to 20 mg weekly during visit 10/2024 for better disease control. Underwent a left eye tube shunt exchange with opthalmology in mid-2024.    #Idiopathic uveitis, L>R eye  - He is up to date on his eye exam, last seen in the ophthalmology clinic earlier this month-->noted that inflammation in the left eye remains improved but not fully resolved, continues to use Pred-Forte eye drops.   - continue methotrexate 20 mg weekly, provided refill  - continue folic acid 1 mg daily, provided refill  - continue prednisolone " eye drops as prescribed by ophthalmology  - continue follow-up with ophthalmology clinic, has upcoming appt 8/12/25      RTC 3 months     Independently reviewed three or more labs  Images including CXR, CT, MRI independently reviewed.   Monitoring for drug toxicity     Case seen and discussed with Dr. Daniels  Signature: Bruno Lindsey MD  Date: July 21, 2025

## 2025-07-22 LAB
ALBUMIN SERPL-MCNC: 4.4 G/DL (ref 3.6–5.1)
ALP SERPL-CCNC: 69 U/L (ref 36–130)
ALT SERPL-CCNC: 19 U/L (ref 9–46)
ANION GAP SERPL CALCULATED.4IONS-SCNC: 8 MMOL/L (CALC) (ref 7–17)
AST SERPL-CCNC: 15 U/L (ref 10–40)
BASOPHILS # BLD AUTO: 38 CELLS/UL (ref 0–200)
BASOPHILS NFR BLD AUTO: 0.8 %
BILIRUB SERPL-MCNC: 0.7 MG/DL (ref 0.2–1.2)
BUN SERPL-MCNC: 21 MG/DL (ref 7–25)
CALCIUM SERPL-MCNC: 9.1 MG/DL (ref 8.6–10.3)
CHLORIDE SERPL-SCNC: 109 MMOL/L (ref 98–110)
CO2 SERPL-SCNC: 21 MMOL/L (ref 20–32)
CREAT SERPL-MCNC: 0.91 MG/DL (ref 0.6–1.26)
CRP SERPL-MCNC: <3 MG/L
EGFRCR SERPLBLD CKD-EPI 2021: 111 ML/MIN/1.73M2
EOSINOPHIL # BLD AUTO: 82 CELLS/UL (ref 15–500)
EOSINOPHIL NFR BLD AUTO: 1.7 %
ERYTHROCYTE [DISTWIDTH] IN BLOOD BY AUTOMATED COUNT: 14 % (ref 11–15)
ERYTHROCYTE [SEDIMENTATION RATE] IN BLOOD BY WESTERGREN METHOD: 2 MM/H
GLUCOSE SERPL-MCNC: 96 MG/DL (ref 65–99)
HCT VFR BLD AUTO: 44.8 % (ref 38.5–50)
HGB BLD-MCNC: 15 G/DL (ref 13.2–17.1)
LYMPHOCYTES # BLD AUTO: 1310 CELLS/UL (ref 850–3900)
LYMPHOCYTES NFR BLD AUTO: 27.3 %
MCH RBC QN AUTO: 29.9 PG (ref 27–33)
MCHC RBC AUTO-ENTMCNC: 33.5 G/DL (ref 32–36)
MCV RBC AUTO: 89.2 FL (ref 80–100)
MONOCYTES # BLD AUTO: 403 CELLS/UL (ref 200–950)
MONOCYTES NFR BLD AUTO: 8.4 %
NEUTROPHILS # BLD AUTO: 2966 CELLS/UL (ref 1500–7800)
NEUTROPHILS NFR BLD AUTO: 61.8 %
PLATELET # BLD AUTO: 268 THOUSAND/UL (ref 140–400)
PMV BLD REES-ECKER: 10 FL (ref 7.5–12.5)
POTASSIUM SERPL-SCNC: 4.3 MMOL/L (ref 3.5–5.3)
PROT SERPL-MCNC: 6.8 G/DL (ref 6.1–8.1)
RBC # BLD AUTO: 5.02 MILLION/UL (ref 4.2–5.8)
SODIUM SERPL-SCNC: 138 MMOL/L (ref 135–146)
WBC # BLD AUTO: 4.8 THOUSAND/UL (ref 3.8–10.8)

## 2025-08-12 ENCOUNTER — APPOINTMENT (OUTPATIENT)
Dept: OPHTHALMOLOGY | Facility: CLINIC | Age: 40
End: 2025-08-12
Payer: COMMERCIAL

## 2025-08-12 DIAGNOSIS — H43.12 VITREOUS HEMORRHAGE OF LEFT EYE (MULTI): Primary | ICD-10-CM

## 2025-08-12 PROCEDURE — 99214 OFFICE O/P EST MOD 30 MIN: CPT

## 2025-08-12 PROCEDURE — 92250 FUNDUS PHOTOGRAPHY W/I&R: CPT

## 2025-08-12 ASSESSMENT — EXTERNAL EXAM - LEFT EYE: OS_EXAM: NORMAL

## 2025-08-12 ASSESSMENT — SLIT LAMP EXAM - LIDS
COMMENTS: NORMAL
COMMENTS: NORMAL

## 2025-08-12 ASSESSMENT — TONOMETRY
OS_IOP_MMHG: 35
OD_IOP_MMHG: 29
IOP_METHOD: GOLDMANN APPLANATION

## 2025-08-12 ASSESSMENT — ENCOUNTER SYMPTOMS
RESPIRATORY NEGATIVE: 0
CARDIOVASCULAR NEGATIVE: 0
HEMATOLOGIC/LYMPHATIC NEGATIVE: 0
PSYCHIATRIC NEGATIVE: 0
CONSTITUTIONAL NEGATIVE: 0
ENDOCRINE NEGATIVE: 0
MUSCULOSKELETAL NEGATIVE: 0
GASTROINTESTINAL NEGATIVE: 0
EYES NEGATIVE: 0
ALLERGIC/IMMUNOLOGIC NEGATIVE: 0
NEUROLOGICAL NEGATIVE: 0

## 2025-08-12 ASSESSMENT — VISUAL ACUITY
OD_CC+: -2
METHOD: SNELLEN - LINEAR
OD_CC: 20/30
CORRECTION_TYPE: GLASSES
OS_CC: CF @ 2 FT

## 2025-08-12 ASSESSMENT — PACHYMETRY
OD_CT(UM): 588
OS_CT(UM): 597

## 2025-08-12 ASSESSMENT — EXTERNAL EXAM - RIGHT EYE: OD_EXAM: NORMAL

## 2025-08-12 ASSESSMENT — CUP TO DISC RATIO
OD_RATIO: 0.6
OS_RATIO: .99

## 2025-08-18 ENCOUNTER — PREP FOR PROCEDURE (OUTPATIENT)
Dept: OPHTHALMOLOGY | Facility: CLINIC | Age: 40
End: 2025-08-18
Payer: COMMERCIAL

## 2025-08-18 DIAGNOSIS — H43.12 VITREOUS HEMORRHAGE OF LEFT EYE (MULTI): Primary | ICD-10-CM

## 2025-08-18 RX ORDER — TROPICAMIDE 10 MG/ML
1 SOLUTION/ DROPS OPHTHALMIC
Status: CANCELLED | OUTPATIENT
Start: 2025-08-18 | End: 2025-08-18

## 2025-08-18 RX ORDER — PHENYLEPHRINE HYDROCHLORIDE 25 MG/ML
1 SOLUTION/ DROPS OPHTHALMIC
Status: CANCELLED | OUTPATIENT
Start: 2025-08-18 | End: 2025-08-18

## 2025-08-18 RX ORDER — PROPARACAINE HYDROCHLORIDE 5 MG/ML
1 SOLUTION/ DROPS OPHTHALMIC ONCE
Status: CANCELLED | OUTPATIENT
Start: 2025-08-18 | End: 2025-08-18

## 2025-08-20 ENCOUNTER — ANESTHESIA EVENT (OUTPATIENT)
Dept: OPERATING ROOM | Facility: CLINIC | Age: 40
End: 2025-08-20
Payer: COMMERCIAL

## 2025-08-21 ENCOUNTER — HOSPITAL ENCOUNTER (OUTPATIENT)
Facility: CLINIC | Age: 40
Setting detail: OUTPATIENT SURGERY
Discharge: HOME | End: 2025-08-21
Payer: COMMERCIAL

## 2025-08-21 ENCOUNTER — ANESTHESIA (OUTPATIENT)
Dept: OPERATING ROOM | Facility: CLINIC | Age: 40
End: 2025-08-21
Payer: COMMERCIAL

## 2025-08-21 VITALS
SYSTOLIC BLOOD PRESSURE: 137 MMHG | DIASTOLIC BLOOD PRESSURE: 81 MMHG | BODY MASS INDEX: 30.59 KG/M2 | OXYGEN SATURATION: 100 % | WEIGHT: 230.82 LBS | HEIGHT: 73 IN | TEMPERATURE: 98.2 F | RESPIRATION RATE: 16 BRPM | HEART RATE: 70 BPM

## 2025-08-21 DIAGNOSIS — H20.9 UVEITIC GLAUCOMA, LEFT, SEVERE STAGE: ICD-10-CM

## 2025-08-21 DIAGNOSIS — H40.42X3 UVEITIC GLAUCOMA, LEFT, SEVERE STAGE: ICD-10-CM

## 2025-08-21 DIAGNOSIS — H43.12 VITREOUS HEMORRHAGE OF LEFT EYE (MULTI): Primary | ICD-10-CM

## 2025-08-21 DIAGNOSIS — H21.02 HYPHEMA OF LEFT EYE: ICD-10-CM

## 2025-08-21 PROBLEM — K21.9 GASTROESOPHAGEAL REFLUX DISEASE: Status: ACTIVE | Noted: 2025-08-21

## 2025-08-21 PROCEDURE — 67039 LASER TREATMENT OF RETINA: CPT

## 2025-08-21 PROCEDURE — 7100000010 HC PHASE TWO TIME - EACH INCREMENTAL 1 MINUTE

## 2025-08-21 PROCEDURE — 2500000005 HC RX 250 GENERAL PHARMACY W/O HCPCS: Mod: SE

## 2025-08-21 PROCEDURE — 3700000002 HC GENERAL ANESTHESIA TIME - EACH INCREMENTAL 1 MINUTE

## 2025-08-21 PROCEDURE — 2720000007 HC OR 272 NO HCPCS

## 2025-08-21 PROCEDURE — 2500000004 HC RX 250 GENERAL PHARMACY W/ HCPCS (ALT 636 FOR OP/ED): Mod: SE

## 2025-08-21 PROCEDURE — 3600000003 HC OR TIME - INITIAL BASE CHARGE - PROCEDURE LEVEL THREE

## 2025-08-21 PROCEDURE — A67040 PR VITRECTOMY,PANRETINAL LASER RX: Performed by: ANESTHESIOLOGIST ASSISTANT

## 2025-08-21 PROCEDURE — 3600000008 HC OR TIME - EACH INCREMENTAL 1 MINUTE - PROCEDURE LEVEL THREE

## 2025-08-21 PROCEDURE — A67040 PR VITRECTOMY,PANRETINAL LASER RX: Performed by: ANESTHESIOLOGY

## 2025-08-21 PROCEDURE — 3700000001 HC GENERAL ANESTHESIA TIME - INITIAL BASE CHARGE

## 2025-08-21 PROCEDURE — 7100000009 HC PHASE TWO TIME - INITIAL BASE CHARGE

## 2025-08-21 PROCEDURE — 2500000004 HC RX 250 GENERAL PHARMACY W/ HCPCS (ALT 636 FOR OP/ED): Mod: SE | Performed by: ANESTHESIOLOGIST ASSISTANT

## 2025-08-21 RX ORDER — PHENYLEPHRINE HYDROCHLORIDE 25 MG/ML
1 SOLUTION/ DROPS OPHTHALMIC
Status: COMPLETED | OUTPATIENT
Start: 2025-08-21 | End: 2025-08-21

## 2025-08-21 RX ORDER — SODIUM CHLORIDE, SODIUM LACTATE, POTASSIUM CHLORIDE, CALCIUM CHLORIDE 600; 310; 30; 20 MG/100ML; MG/100ML; MG/100ML; MG/100ML
100 INJECTION, SOLUTION INTRAVENOUS CONTINUOUS
Status: DISCONTINUED | OUTPATIENT
Start: 2025-08-21 | End: 2025-08-21 | Stop reason: HOSPADM

## 2025-08-21 RX ORDER — FENTANYL CITRATE 50 UG/ML
50 INJECTION, SOLUTION INTRAMUSCULAR; INTRAVENOUS EVERY 5 MIN PRN
Status: DISCONTINUED | OUTPATIENT
Start: 2025-08-21 | End: 2025-08-21 | Stop reason: HOSPADM

## 2025-08-21 RX ORDER — TROPICAMIDE 10 MG/ML
1 SOLUTION/ DROPS OPHTHALMIC
Status: COMPLETED | OUTPATIENT
Start: 2025-08-21 | End: 2025-08-21

## 2025-08-21 RX ORDER — ACETAMINOPHEN 325 MG/1
650 TABLET ORAL EVERY 4 HOURS PRN
Status: DISCONTINUED | OUTPATIENT
Start: 2025-08-21 | End: 2025-08-21 | Stop reason: HOSPADM

## 2025-08-21 RX ORDER — OFLOXACIN 3 MG/ML
1 SOLUTION/ DROPS OPHTHALMIC 4 TIMES DAILY
Qty: 5 ML | Refills: 2 | Status: SHIPPED | OUTPATIENT
Start: 2025-08-21 | End: 2025-08-28

## 2025-08-21 RX ORDER — PROPARACAINE HYDROCHLORIDE 5 MG/ML
1 SOLUTION/ DROPS OPHTHALMIC ONCE
Status: COMPLETED | OUTPATIENT
Start: 2025-08-21 | End: 2025-08-21

## 2025-08-21 RX ORDER — PREDNISOLONE ACETATE 10 MG/ML
1 SUSPENSION/ DROPS OPHTHALMIC 4 TIMES DAILY
Qty: 5 ML | Refills: 2 | Status: SHIPPED | OUTPATIENT
Start: 2025-08-21 | End: 2025-09-20

## 2025-08-21 RX ORDER — DEXAMETHASONE SODIUM PHOSPHATE 10 MG/ML
INJECTION INTRAMUSCULAR; INTRAVENOUS AS NEEDED
Status: DISCONTINUED | OUTPATIENT
Start: 2025-08-21 | End: 2025-08-21 | Stop reason: HOSPADM

## 2025-08-21 RX ORDER — SODIUM CHLORIDE, SODIUM LACTATE, POTASSIUM CHLORIDE, CALCIUM CHLORIDE 600; 310; 30; 20 MG/100ML; MG/100ML; MG/100ML; MG/100ML
INJECTION, SOLUTION INTRAVENOUS CONTINUOUS PRN
Status: DISCONTINUED | OUTPATIENT
Start: 2025-08-21 | End: 2025-08-21

## 2025-08-21 RX ORDER — WATER 1 ML/ML
INJECTION IRRIGATION AS NEEDED
Status: DISCONTINUED | OUTPATIENT
Start: 2025-08-21 | End: 2025-08-21 | Stop reason: HOSPADM

## 2025-08-21 RX ORDER — MIDAZOLAM HYDROCHLORIDE 1 MG/ML
INJECTION, SOLUTION INTRAMUSCULAR; INTRAVENOUS AS NEEDED
Status: DISCONTINUED | OUTPATIENT
Start: 2025-08-21 | End: 2025-08-21

## 2025-08-21 RX ORDER — NEOMYCIN SULFATE, POLYMYXIN B SULFATE, AND DEXAMETHASONE 3.5; 10000; 1 MG/G; [USP'U]/G; MG/G
OINTMENT OPHTHALMIC AS NEEDED
Status: DISCONTINUED | OUTPATIENT
Start: 2025-08-21 | End: 2025-08-21 | Stop reason: HOSPADM

## 2025-08-21 RX ORDER — LIDOCAINE HYDROCHLORIDE 20 MG/ML
INJECTION, SOLUTION INFILTRATION; PERINEURAL AS NEEDED
Status: DISCONTINUED | OUTPATIENT
Start: 2025-08-21 | End: 2025-08-21 | Stop reason: HOSPADM

## 2025-08-21 RX ORDER — POVIDONE-IODINE 5 %
SOLUTION, NON-ORAL OPHTHALMIC (EYE) AS NEEDED
Status: DISCONTINUED | OUTPATIENT
Start: 2025-08-21 | End: 2025-08-21 | Stop reason: HOSPADM

## 2025-08-21 RX ORDER — ONDANSETRON HYDROCHLORIDE 2 MG/ML
4 INJECTION, SOLUTION INTRAVENOUS ONCE AS NEEDED
Status: DISCONTINUED | OUTPATIENT
Start: 2025-08-21 | End: 2025-08-21 | Stop reason: HOSPADM

## 2025-08-21 RX ORDER — OXYCODONE HYDROCHLORIDE 5 MG/1
5 TABLET ORAL EVERY 4 HOURS PRN
Status: DISCONTINUED | OUTPATIENT
Start: 2025-08-21 | End: 2025-08-21 | Stop reason: HOSPADM

## 2025-08-21 RX ORDER — FENTANYL CITRATE 50 UG/ML
INJECTION, SOLUTION INTRAMUSCULAR; INTRAVENOUS AS NEEDED
Status: DISCONTINUED | OUTPATIENT
Start: 2025-08-21 | End: 2025-08-21

## 2025-08-21 RX ORDER — BUPIVACAINE HYDROCHLORIDE 7.5 MG/ML
INJECTION, SOLUTION EPIDURAL; RETROBULBAR AS NEEDED
Status: DISCONTINUED | OUTPATIENT
Start: 2025-08-21 | End: 2025-08-21 | Stop reason: HOSPADM

## 2025-08-21 RX ORDER — PROPOFOL 10 MG/ML
INJECTION, EMULSION INTRAVENOUS AS NEEDED
Status: DISCONTINUED | OUTPATIENT
Start: 2025-08-21 | End: 2025-08-21

## 2025-08-21 RX ORDER — CEFAZOLIN 1 G/1
INJECTION, POWDER, FOR SOLUTION INTRAVENOUS AS NEEDED
Status: DISCONTINUED | OUTPATIENT
Start: 2025-08-21 | End: 2025-08-21 | Stop reason: HOSPADM

## 2025-08-21 RX ORDER — LIDOCAINE HYDROCHLORIDE 10 MG/ML
0.1 INJECTION, SOLUTION EPIDURAL; INFILTRATION; INTRACAUDAL; PERINEURAL ONCE
Status: DISCONTINUED | OUTPATIENT
Start: 2025-08-21 | End: 2025-08-21 | Stop reason: HOSPADM

## 2025-08-21 RX ADMIN — PHENYLEPHRINE HYDROCHLORIDE 1 DROP: 25 SOLUTION/ DROPS OPHTHALMIC at 10:06

## 2025-08-21 RX ADMIN — PROPOFOL 20 MG: 10 INJECTION, EMULSION INTRAVENOUS at 12:06

## 2025-08-21 RX ADMIN — FENTANYL CITRATE 25 MCG: 50 INJECTION, SOLUTION INTRAMUSCULAR; INTRAVENOUS at 12:10

## 2025-08-21 RX ADMIN — FENTANYL CITRATE 50 MCG: 50 INJECTION, SOLUTION INTRAMUSCULAR; INTRAVENOUS at 12:02

## 2025-08-21 RX ADMIN — FENTANYL CITRATE 25 MCG: 50 INJECTION, SOLUTION INTRAMUSCULAR; INTRAVENOUS at 12:29

## 2025-08-21 RX ADMIN — PROPARACAINE HYDROCHLORIDE 1 DROP: 5 SOLUTION/ DROPS OPHTHALMIC at 10:01

## 2025-08-21 RX ADMIN — PROPOFOL 50 MG: 10 INJECTION, EMULSION INTRAVENOUS at 12:02

## 2025-08-21 RX ADMIN — TROPICAMIDE 1 DROP: 10 SOLUTION/ DROPS OPHTHALMIC at 10:01

## 2025-08-21 RX ADMIN — TROPICAMIDE 1 DROP: 10 SOLUTION/ DROPS OPHTHALMIC at 10:11

## 2025-08-21 RX ADMIN — PROPOFOL 30 MG: 10 INJECTION, EMULSION INTRAVENOUS at 12:04

## 2025-08-21 RX ADMIN — TROPICAMIDE 1 DROP: 10 SOLUTION/ DROPS OPHTHALMIC at 10:06

## 2025-08-21 RX ADMIN — PHENYLEPHRINE HYDROCHLORIDE 1 DROP: 25 SOLUTION/ DROPS OPHTHALMIC at 10:11

## 2025-08-21 RX ADMIN — MIDAZOLAM 2 MG: 1 INJECTION INTRAMUSCULAR; INTRAVENOUS at 12:02

## 2025-08-21 RX ADMIN — PHENYLEPHRINE HYDROCHLORIDE 1 DROP: 25 SOLUTION/ DROPS OPHTHALMIC at 10:01

## 2025-08-21 RX ADMIN — SODIUM CHLORIDE, POTASSIUM CHLORIDE, SODIUM LACTATE AND CALCIUM CHLORIDE: 600; 310; 30; 20 INJECTION, SOLUTION INTRAVENOUS at 11:48

## 2025-08-21 ASSESSMENT — PAIN - FUNCTIONAL ASSESSMENT
PAIN_FUNCTIONAL_ASSESSMENT: 0-10

## 2025-08-21 ASSESSMENT — PAIN SCALES - GENERAL
PAINLEVEL_OUTOF10: 0 - NO PAIN

## 2025-08-22 ENCOUNTER — OFFICE VISIT (OUTPATIENT)
Dept: OPHTHALMOLOGY | Facility: CLINIC | Age: 40
End: 2025-08-22
Payer: COMMERCIAL

## 2025-08-22 ASSESSMENT — EXTERNAL EXAM - LEFT EYE: OS_EXAM: NORMAL

## 2025-08-22 ASSESSMENT — VISUAL ACUITY
OS_SC+: +2
METHOD: SNELLEN - LINEAR
OS_SC: 20/100

## 2025-08-22 ASSESSMENT — ENCOUNTER SYMPTOMS
PSYCHIATRIC NEGATIVE: 0
EYES NEGATIVE: 1
RESPIRATORY NEGATIVE: 0
MUSCULOSKELETAL NEGATIVE: 0
HEMATOLOGIC/LYMPHATIC NEGATIVE: 0
ENDOCRINE NEGATIVE: 0
GASTROINTESTINAL NEGATIVE: 0
CARDIOVASCULAR NEGATIVE: 0
ALLERGIC/IMMUNOLOGIC NEGATIVE: 0
CONSTITUTIONAL NEGATIVE: 0
NEUROLOGICAL NEGATIVE: 0

## 2025-08-22 ASSESSMENT — TONOMETRY
IOP_METHOD: TONOPEN
OS_IOP_MMHG: 15

## 2025-08-22 ASSESSMENT — SLIT LAMP EXAM - LIDS
COMMENTS: NORMAL
COMMENTS: NORMAL

## 2025-08-22 ASSESSMENT — PACHYMETRY
OD_CT(UM): 588
OS_CT(UM): 597

## 2025-08-22 ASSESSMENT — EXTERNAL EXAM - RIGHT EYE: OD_EXAM: NORMAL

## 2025-08-22 ASSESSMENT — CUP TO DISC RATIO
OS_RATIO: .99
OD_RATIO: 0.6

## 2025-08-27 ENCOUNTER — APPOINTMENT (OUTPATIENT)
Dept: OPHTHALMOLOGY | Facility: CLINIC | Age: 40
End: 2025-08-27
Payer: COMMERCIAL

## 2025-08-27 DIAGNOSIS — H43.12 VITREOUS HEMORRHAGE OF LEFT EYE (MULTI): ICD-10-CM

## 2025-08-27 DIAGNOSIS — H40.1134 PRIMARY OPEN ANGLE GLAUCOMA OF BOTH EYES, INDETERMINATE STAGE: Primary | ICD-10-CM

## 2025-08-27 PROCEDURE — 99024 POSTOP FOLLOW-UP VISIT: CPT

## 2025-08-27 ASSESSMENT — REFRACTION_WEARINGRX
OD_SPHERE: -3.00
OS_CYLINDER: +1.75
OS_SPHERE: -3.25
OS_AXIS: 108
OD_AXIS: 077
OD_CYLINDER: +1.75

## 2025-08-27 ASSESSMENT — VISUAL ACUITY
OS_SC+: -1
OS_SC: 20/400
METHOD: SNELLEN - LINEAR

## 2025-08-27 ASSESSMENT — ENCOUNTER SYMPTOMS
MUSCULOSKELETAL NEGATIVE: 0
GASTROINTESTINAL NEGATIVE: 0
NEUROLOGICAL NEGATIVE: 0
RESPIRATORY NEGATIVE: 0
EYES NEGATIVE: 0
CONSTITUTIONAL NEGATIVE: 0
PSYCHIATRIC NEGATIVE: 0
ALLERGIC/IMMUNOLOGIC NEGATIVE: 0
HEMATOLOGIC/LYMPHATIC NEGATIVE: 0
ENDOCRINE NEGATIVE: 0
CARDIOVASCULAR NEGATIVE: 1

## 2025-08-27 ASSESSMENT — SLIT LAMP EXAM - LIDS
COMMENTS: NORMAL
COMMENTS: NORMAL

## 2025-08-27 ASSESSMENT — CUP TO DISC RATIO
OD_RATIO: 0.6
OS_RATIO: .99

## 2025-08-27 ASSESSMENT — CONF VISUAL FIELD
OS_SUPERIOR_NASAL_RESTRICTION: 0
OD_SUPERIOR_TEMPORAL_RESTRICTION: 0
OS_INFERIOR_NASAL_RESTRICTION: 0
OD_INFERIOR_NASAL_RESTRICTION: 0
OD_SUPERIOR_NASAL_RESTRICTION: 0
OD_INFERIOR_TEMPORAL_RESTRICTION: 0
OS_SUPERIOR_TEMPORAL_RESTRICTION: 0
OS_INFERIOR_TEMPORAL_RESTRICTION: 0

## 2025-08-27 ASSESSMENT — PACHYMETRY
OD_CT(UM): 588
OS_CT(UM): 597

## 2025-08-27 ASSESSMENT — TONOMETRY
OS_IOP_MMHG: 12-13
IOP_METHOD: TONOPEN

## 2025-08-27 ASSESSMENT — EXTERNAL EXAM - LEFT EYE: OS_EXAM: NORMAL

## 2025-08-27 ASSESSMENT — EXTERNAL EXAM - RIGHT EYE: OD_EXAM: NORMAL

## 2025-09-11 ENCOUNTER — APPOINTMENT (OUTPATIENT)
Dept: OPHTHALMOLOGY | Facility: CLINIC | Age: 40
End: 2025-09-11
Payer: COMMERCIAL

## 2025-09-24 ENCOUNTER — APPOINTMENT (OUTPATIENT)
Dept: OPHTHALMOLOGY | Facility: CLINIC | Age: 40
End: 2025-09-24
Payer: COMMERCIAL

## 2025-11-03 ENCOUNTER — APPOINTMENT (OUTPATIENT)
Dept: RHEUMATOLOGY | Facility: CLINIC | Age: 40
End: 2025-11-03
Payer: COMMERCIAL

## (undated) DEVICE — REST, HEAD, BAGEL, 9 IN

## (undated) DEVICE — BLADE, OPHTHALMIC, SCLEROTOME, MULTI-SIDED, SHARP ALL AROUND

## (undated) DEVICE — SOLUTION, BSS IRRIGATING 15ML

## (undated) DEVICE — GOWN, ASTOUND, L

## (undated) DEVICE — GLOVE, SURGICAL, PROTEXIS PI , 7.5, PF, LF

## (undated) DEVICE — Device

## (undated) DEVICE — LABELS, OR GENERAL, W/MARKER

## (undated) DEVICE — KNIFE, SIDEPORT, DUAL BEVEL, ANGLED, 1.0MM

## (undated) DEVICE — ERASER, HEMOSTATIC, WET-FIELD, BIPOLAR, 18 G

## (undated) DEVICE — CORD, BIPOLAR,  12 FT, DISPOSABLE, LF

## (undated) DEVICE — TOWEL, SURGICAL, NEURO, O/R, 16 X 26, BLUE, STERILE

## (undated) DEVICE — SOLUTION, IRRIGATION, USP, STERILE WATER, 250ML, BOTTLE

## (undated) DEVICE — SPEAR, EYE, SURGICAL, WECK-CELL, CELLULOSE

## (undated) DEVICE — CANNULA, 27G X 22MM, ANTERIOR, CHAMBER, RYCROFT

## (undated) DEVICE — COVER HANDLE LIGHT, STERIS, BLUE, STERILE

## (undated) DEVICE — HANDLE, MST, F/MST TIPS

## (undated) DEVICE — SPEAR, EYE, SURGICAL, WECK-CEL, CELLULOSE

## (undated) DEVICE — SYRINGE, 5 CC, LUER LOCK

## (undated) DEVICE — SUTURE, VICRYL, 7-0, 18 IN, TG1608, DA, VIOLET

## (undated) DEVICE — DRESSING, TRANSPARENT, TEGADERM, 4 X 4-3/4 IN

## (undated) DEVICE — NEEDLE, HYPODERMIC, REGULAR WALL, REGULAR BEVEL, 30 G X 0.5 IN

## (undated) DEVICE — SUTURE, SILK, 2-0, 18IN, BR, FS, BLACK

## (undated) DEVICE — SYRINGE, 1 CC, LUER LOCK

## (undated) DEVICE — SUTURE, VICRYL, 8-0, 12 IN, TG1406, DA, VIOLET

## (undated) DEVICE — CORD, CAUTERY, BIPOLAR, STERILE

## (undated) DEVICE — SHIELD, EYE, FOX, CONVEX, ALUMINUM, NS

## (undated) DEVICE — BLADE, OPHTHALMIC, CRESCENT, POCKET, ANG 55 DEG, 2.5 MM, MATTE

## (undated) DEVICE — DRAPE, INCISE, STERI DRAPE, 48 X 51 IN, LF, STERILE

## (undated) DEVICE — CANNULA, VISTEC, ANTERIOR CHAMBER, RYCROFT, ANGLED, 30 G X 7/8 IN

## (undated) DEVICE — DRAPE, TOWEL, STERI DRAPE, 17 X 11 IN, PLASTIC, STERILE

## (undated) DEVICE — COUNTER, NEEDLE, FOAM BLOCK, W/MAGNET, W/BLADE GUARD, 10 COUNT, RED, LF

## (undated) DEVICE — BLADE, MVR, 20 GA

## (undated) DEVICE — SOLUTION, IRRIGATING, INTRAOCULAR, BSS PLUS, 500ML

## (undated) DEVICE — GLOVE, SURGICAL, PROTEXIS PI , 6.5, PF, LF

## (undated) DEVICE — SUTURE, ETHILON, 10-0, 12 IN, TG160-6, BLACK

## (undated) DEVICE — COVER, CART, 45 X 27 X 48 IN, CLEAR

## (undated) DEVICE — PROBE, 25G ILLUMINATED FLEX CURVED LASER 2X W/RFID

## (undated) DEVICE — SYRINGE, HYPODERMIC, TB, W/O NEEDLE, 1 CC, SLIP TIP

## (undated) DEVICE — NEEDLE, HYPODERMIC, 23G X 1-1/4 IN

## (undated) DEVICE — BLADE, BEAVER, MINI, 15, STAINLESS STEEL

## (undated) DEVICE — NEEDLE, ARTERIAL/VENOUS, BLUNT FILL, 18 G X 1.5 IN

## (undated) DEVICE — NEEDLE, FILTER 19 G X 1 IN

## (undated) DEVICE — PAD, EYE, OVAL, 1 5/8 X 2 5/8 IN, STERILE

## (undated) DEVICE — CANNULA, ANTERIOR CHAMBER

## (undated) DEVICE — BLADE, OPHTHALMIC, MINI, STRAIGHT, DOUBLE BEVEL, SHARP ALL AROUND

## (undated) DEVICE — 25+GA HYPERVIT BEVEL 20K CPM WIDE TOTAL PLUS VITRECTOMY PAK